# Patient Record
Sex: MALE | Race: WHITE | Employment: FULL TIME | URBAN - METROPOLITAN AREA
[De-identification: names, ages, dates, MRNs, and addresses within clinical notes are randomized per-mention and may not be internally consistent; named-entity substitution may affect disease eponyms.]

---

## 2023-03-05 ENCOUNTER — APPOINTMENT (OUTPATIENT)
Dept: CT IMAGING | Age: 63
DRG: 623 | End: 2023-03-05
Payer: COMMERCIAL

## 2023-03-05 ENCOUNTER — HOSPITAL ENCOUNTER (INPATIENT)
Age: 63
LOS: 4 days | Discharge: NURSING, REHAB OR LONG-TERM CARE | DRG: 623 | End: 2023-03-09
Attending: INTERNAL MEDICINE | Admitting: INTERNAL MEDICINE
Payer: COMMERCIAL

## 2023-03-05 DIAGNOSIS — M86.9 OSTEOMYELITIS OF GREAT TOE OF LEFT FOOT (HCC): ICD-10-CM

## 2023-03-05 DIAGNOSIS — L03.116 CELLULITIS OF LEFT LOWER EXTREMITY: Primary | ICD-10-CM

## 2023-03-05 DIAGNOSIS — R73.9 HYPERGLYCEMIA: ICD-10-CM

## 2023-03-05 PROBLEM — L03.90 CELLULITIS: Status: ACTIVE | Noted: 2023-03-05

## 2023-03-05 LAB
ALBUMIN SERPL-MCNC: 4.1 G/DL (ref 3.5–4.6)
ALP BLD-CCNC: 77 U/L (ref 35–104)
ALT SERPL-CCNC: 21 U/L (ref 0–41)
ANION GAP SERPL CALCULATED.3IONS-SCNC: 13 MEQ/L (ref 9–15)
AST SERPL-CCNC: 17 U/L (ref 0–40)
BASOPHILS ABSOLUTE: 0 K/UL (ref 0–0.1)
BASOPHILS RELATIVE PERCENT: 0.3 % (ref 0.2–1.2)
BILIRUB SERPL-MCNC: 0.5 MG/DL (ref 0.2–0.7)
BUN BLDV-MCNC: 15 MG/DL (ref 8–23)
C-REACTIVE PROTEIN: 9.1 MG/L (ref 0–5)
CALCIUM SERPL-MCNC: 9.4 MG/DL (ref 8.5–9.9)
CHLORIDE BLD-SCNC: 96 MEQ/L (ref 95–107)
CO2: 24 MEQ/L (ref 20–31)
CREAT SERPL-MCNC: 0.75 MG/DL (ref 0.7–1.2)
EOSINOPHILS ABSOLUTE: 0 K/UL (ref 0–0.5)
EOSINOPHILS RELATIVE PERCENT: 0.3 % (ref 0.8–7)
GFR SERPL CREATININE-BSD FRML MDRD: >60 ML/MIN/{1.73_M2}
GLOBULIN: 3.3 G/DL (ref 2.3–3.5)
GLUCOSE BLD-MCNC: 311 MG/DL (ref 70–99)
GLUCOSE BLD-MCNC: 334 MG/DL (ref 70–99)
HCT VFR BLD CALC: 42.6 % (ref 42–52)
HEMOGLOBIN: 14.4 G/DL (ref 13.7–17.5)
IMMATURE GRANULOCYTES #: 0 K/UL
IMMATURE GRANULOCYTES %: 0.3 %
LACTIC ACID: 1.5 MMOL/L (ref 0.5–2.2)
LYMPHOCYTES ABSOLUTE: 1.9 K/UL (ref 1.3–3.6)
LYMPHOCYTES RELATIVE PERCENT: 24.3 %
MCH RBC QN AUTO: 29.9 PG (ref 25.7–32.2)
MCHC RBC AUTO-ENTMCNC: 33.8 % (ref 32.3–36.5)
MCV RBC AUTO: 88.6 FL (ref 79–92.2)
MONOCYTES ABSOLUTE: 0.5 K/UL (ref 0.3–0.8)
MONOCYTES RELATIVE PERCENT: 5.8 % (ref 5.3–12.2)
NEUTROPHILS ABSOLUTE: 5.4 K/UL (ref 1.8–5.4)
NEUTROPHILS RELATIVE PERCENT: 69 % (ref 34–67.9)
PDW BLD-RTO: 12.2 % (ref 11.6–14.4)
PERFORMED ON: ABNORMAL
PLATELET # BLD: 215 K/UL (ref 163–337)
POTASSIUM REFLEX MAGNESIUM: 3.8 MEQ/L (ref 3.4–4.9)
RBC # BLD: 4.81 M/UL (ref 4.63–6.08)
SEDIMENTATION RATE, ERYTHROCYTE: 22 MM (ref 0–20)
SODIUM BLD-SCNC: 133 MEQ/L (ref 135–144)
TOTAL PROTEIN: 7.4 G/DL (ref 6.3–8)
WBC # BLD: 7.8 K/UL (ref 4.2–9)

## 2023-03-05 PROCEDURE — 86140 C-REACTIVE PROTEIN: CPT

## 2023-03-05 PROCEDURE — 83605 ASSAY OF LACTIC ACID: CPT

## 2023-03-05 PROCEDURE — 2580000003 HC RX 258: Performed by: INTERNAL MEDICINE

## 2023-03-05 PROCEDURE — 2580000003 HC RX 258

## 2023-03-05 PROCEDURE — 96367 TX/PROPH/DG ADDL SEQ IV INF: CPT

## 2023-03-05 PROCEDURE — 85025 COMPLETE CBC W/AUTO DIFF WBC: CPT

## 2023-03-05 PROCEDURE — 87070 CULTURE OTHR SPECIMN AEROBIC: CPT

## 2023-03-05 PROCEDURE — 87186 SC STD MICRODIL/AGAR DIL: CPT

## 2023-03-05 PROCEDURE — 96365 THER/PROPH/DIAG IV INF INIT: CPT

## 2023-03-05 PROCEDURE — 6360000002 HC RX W HCPCS

## 2023-03-05 PROCEDURE — 36415 COLL VENOUS BLD VENIPUNCTURE: CPT

## 2023-03-05 PROCEDURE — 1210000000 HC MED SURG R&B

## 2023-03-05 PROCEDURE — 73701 CT LOWER EXTREMITY W/DYE: CPT

## 2023-03-05 PROCEDURE — 87040 BLOOD CULTURE FOR BACTERIA: CPT

## 2023-03-05 PROCEDURE — 85652 RBC SED RATE AUTOMATED: CPT

## 2023-03-05 PROCEDURE — 99285 EMERGENCY DEPT VISIT HI MDM: CPT

## 2023-03-05 PROCEDURE — 83036 HEMOGLOBIN GLYCOSYLATED A1C: CPT

## 2023-03-05 PROCEDURE — 80053 COMPREHEN METABOLIC PANEL: CPT

## 2023-03-05 PROCEDURE — 6360000004 HC RX CONTRAST MEDICATION

## 2023-03-05 PROCEDURE — 87077 CULTURE AEROBIC IDENTIFY: CPT

## 2023-03-05 PROCEDURE — 6360000002 HC RX W HCPCS: Performed by: INTERNAL MEDICINE

## 2023-03-05 RX ORDER — INSULIN LISPRO 100 [IU]/ML
0-4 INJECTION, SOLUTION INTRAVENOUS; SUBCUTANEOUS NIGHTLY
Status: DISCONTINUED | OUTPATIENT
Start: 2023-03-05 | End: 2023-03-09 | Stop reason: HOSPADM

## 2023-03-05 RX ORDER — ACETAMINOPHEN 650 MG/1
650 SUPPOSITORY RECTAL EVERY 6 HOURS PRN
Status: DISCONTINUED | OUTPATIENT
Start: 2023-03-05 | End: 2023-03-09 | Stop reason: HOSPADM

## 2023-03-05 RX ORDER — PROMETHAZINE HYDROCHLORIDE 12.5 MG/1
12.5 TABLET ORAL EVERY 6 HOURS PRN
Status: DISCONTINUED | OUTPATIENT
Start: 2023-03-05 | End: 2023-03-09 | Stop reason: HOSPADM

## 2023-03-05 RX ORDER — DEXTROSE MONOHYDRATE 100 MG/ML
INJECTION, SOLUTION INTRAVENOUS CONTINUOUS PRN
Status: DISCONTINUED | OUTPATIENT
Start: 2023-03-05 | End: 2023-03-09 | Stop reason: HOSPADM

## 2023-03-05 RX ORDER — ACETAMINOPHEN 325 MG/1
650 TABLET ORAL EVERY 6 HOURS PRN
Status: DISCONTINUED | OUTPATIENT
Start: 2023-03-05 | End: 2023-03-09 | Stop reason: HOSPADM

## 2023-03-05 RX ORDER — INSULIN LISPRO 100 [IU]/ML
0-8 INJECTION, SOLUTION INTRAVENOUS; SUBCUTANEOUS
Status: DISCONTINUED | OUTPATIENT
Start: 2023-03-06 | End: 2023-03-09 | Stop reason: HOSPADM

## 2023-03-05 RX ORDER — ONDANSETRON 2 MG/ML
4 INJECTION INTRAMUSCULAR; INTRAVENOUS EVERY 6 HOURS PRN
Status: DISCONTINUED | OUTPATIENT
Start: 2023-03-05 | End: 2023-03-09 | Stop reason: HOSPADM

## 2023-03-05 RX ORDER — 0.9 % SODIUM CHLORIDE 0.9 %
500 INTRAVENOUS SOLUTION INTRAVENOUS ONCE
Status: COMPLETED | OUTPATIENT
Start: 2023-03-05 | End: 2023-03-05

## 2023-03-05 RX ORDER — SODIUM CHLORIDE 0.9 % (FLUSH) 0.9 %
10 SYRINGE (ML) INJECTION EVERY 12 HOURS SCHEDULED
Status: DISCONTINUED | OUTPATIENT
Start: 2023-03-05 | End: 2023-03-08

## 2023-03-05 RX ORDER — ENOXAPARIN SODIUM 100 MG/ML
30 INJECTION SUBCUTANEOUS 2 TIMES DAILY
Status: DISCONTINUED | OUTPATIENT
Start: 2023-03-06 | End: 2023-03-09 | Stop reason: HOSPADM

## 2023-03-05 RX ORDER — SODIUM CHLORIDE 9 MG/ML
INJECTION, SOLUTION INTRAVENOUS PRN
Status: DISCONTINUED | OUTPATIENT
Start: 2023-03-05 | End: 2023-03-09 | Stop reason: HOSPADM

## 2023-03-05 RX ORDER — POLYETHYLENE GLYCOL 3350 17 G/17G
17 POWDER, FOR SOLUTION ORAL DAILY PRN
Status: DISCONTINUED | OUTPATIENT
Start: 2023-03-05 | End: 2023-03-09 | Stop reason: HOSPADM

## 2023-03-05 RX ORDER — SODIUM CHLORIDE 0.9 % (FLUSH) 0.9 %
10 SYRINGE (ML) INJECTION PRN
Status: DISCONTINUED | OUTPATIENT
Start: 2023-03-05 | End: 2023-03-09 | Stop reason: HOSPADM

## 2023-03-05 RX ORDER — INSULIN LISPRO 100 [IU]/ML
5 INJECTION, SOLUTION INTRAVENOUS; SUBCUTANEOUS
Status: DISCONTINUED | OUTPATIENT
Start: 2023-03-06 | End: 2023-03-09 | Stop reason: HOSPADM

## 2023-03-05 RX ADMIN — IOPAMIDOL 75 ML: 612 INJECTION, SOLUTION INTRAVENOUS at 17:17

## 2023-03-05 RX ADMIN — CEFAZOLIN 2000 MG: 1 INJECTION, POWDER, FOR SOLUTION INTRAMUSCULAR; INTRAVENOUS at 21:30

## 2023-03-05 RX ADMIN — SODIUM CHLORIDE 500 ML: 9 INJECTION, SOLUTION INTRAVENOUS at 17:59

## 2023-03-05 RX ADMIN — INSULIN LISPRO 4 UNITS: 100 INJECTION, SOLUTION INTRAVENOUS; SUBCUTANEOUS at 21:33

## 2023-03-05 RX ADMIN — VANCOMYCIN HYDROCHLORIDE 1000 MG: 1 INJECTION, POWDER, LYOPHILIZED, FOR SOLUTION INTRAVENOUS at 19:56

## 2023-03-05 RX ADMIN — Medication 10 ML: at 21:31

## 2023-03-05 RX ADMIN — CEFTRIAXONE 1000 MG: 1 INJECTION, POWDER, FOR SOLUTION INTRAMUSCULAR; INTRAVENOUS at 17:59

## 2023-03-05 ASSESSMENT — ENCOUNTER SYMPTOMS
VOMITING: 0
SHORTNESS OF BREATH: 0
SORE THROAT: 0
COUGH: 0
DIARRHEA: 0
BACK PAIN: 0
ABDOMINAL PAIN: 0
NAUSEA: 0

## 2023-03-05 ASSESSMENT — PAIN SCALES - GENERAL: PAINLEVEL_OUTOF10: 5

## 2023-03-05 ASSESSMENT — PAIN - FUNCTIONAL ASSESSMENT: PAIN_FUNCTIONAL_ASSESSMENT: 0-10

## 2023-03-05 NOTE — ED PROVIDER NOTES
[facility] ED  eMERGENCYdEPARTMENT eNCOUnter      Pt Name: Geovanna Montes  MRN: [id number]  [place] [id number] evaluation: 3/5/2023  Provider:CINTHIA Herron CNP    CHIEF COMPLAINT       Chief Complaint   Patient presents with    Toe Pain         HISTORY OF PRESENT ILLNESS  (Location/Symptom, Timing/Onset, Context/Setting, Quality, Duration, Modifying Factors, Severity.)   Geovanna Montes is a 57 y.o. male  no significant known medical hx who presents to the emergency department with toe pain. Patient states 1 month ago he was grinding a callus off his big toe on left side when he caused a abrasion to the skin that got infected. He was treated with doxycyline from urgent care a couple weeks ago and states infection is now worse. He denies any pain or fever. Denies any CP, SOB, fever, chills, nausea, emesis, abdominal pain, headache. HPI    Nursing Notes were reviewed and I agree. REVIEW OF SYSTEMS    (2-9 systems for level 4, 10 or more for level 5)     Review of Systems   Constitutional:  Negative for activity change, chills and fever. HENT:  Negative for ear pain and sore throat. Eyes:  Negative for visual disturbance. Respiratory:  Negative for cough and shortness of breath. Cardiovascular:  Negative for chest pain, palpitations and leg swelling. Gastrointestinal:  Negative for abdominal pain, diarrhea, nausea and vomiting. Genitourinary:  Negative for dysuria. Musculoskeletal:  Positive for arthralgias (left great toe pain). Negative for back pain. Skin:  Positive for wound (left great toe). Negative for rash. Neurological:  Negative for dizziness and weakness. as noted above the remainder of the review of systems was reviewed and negative. PAST MEDICAL HISTORY   No past medical history on file. SURGICAL HISTORY     No past surgical history on file.       CURRENT MEDICATIONS       Previous Medications    No medications on file       ALLERGIES Patient has no known allergies. HISTORY     No family history on file. SOCIAL HISTORY       Social History     Socioeconomic History    Marital status: Single       SCREENINGS    [place] Coma Scale  Eye Opening: Spontaneous  Best Verbal Response: Oriented  Best Motor Response: Obeys commands  [place] Coma Scale Score: 15      PHYSICAL EXAM    (up to 7 forlevel 4, 8 or more for level 5)     ED Triage Vitals   BP Temp Temp src Pulse Resp SpO2 Height Weight   -- -- -- -- -- -- -- --       Physical Exam  Vitals and nursing note reviewed. Constitutional:       General: He is not in acute distress. Appearance: He is well-developed. He is not ill-appearing. HENT:      Head: Normocephalic. Right Ear: External ear normal.      Left Ear: External ear normal.      Mouth/Throat:      Mouth: Mucous membranes are moist.   Eyes:      Conjunctiva/sclera: Conjunctivae normal.      Pupils: Pupils are equal, round, and reactive to light. Cardiovascular:      Rate and Rhythm: Normal rate and regular rhythm. Pulses: Normal pulses. Dorsalis pedis pulses are 2+ on the right side and 2+ on the left side. Posterior tibial pulses are 2+ on the right side and 2+ on the left side. Heart sounds: Normal heart sounds. No murmur heard. No friction rub. Pulmonary:      Effort: Pulmonary effort is normal.      Breath sounds: Normal breath sounds. No wheezing or rhonchi. Abdominal:      General: Bowel sounds are normal. There is no distension. Palpations: Abdomen is soft. Tenderness: There is no abdominal tenderness. Musculoskeletal:         General: Normal range of motion. Cervical back: Normal range of motion and neck supple. Right lower leg: No edema. Left lower leg: No edema. Feet:    Feet:      Comments: Wound tunneling about 0.5 cm with erythema present to left outer great toe. Redness travels from wound site to left ankle.   Skin:     General: Skin is warm and dry. Capillary Refill: Capillary refill takes less than 2 seconds. Findings: Erythema present. Neurological:      General: No focal deficit present. Mental Status: He is alert and oriented to person, place, and time. Mental status is at baseline. Psychiatric:         Mood and Affect: Mood normal.         Behavior: Behavior normal.         Thought Content: Thought content normal.         Judgment: Judgment normal.         DIAGNOSTIC RESULTS     RADIOLOGY:   Non-plain film images such as CT, Ultrasound and MRI are read by the radiologist. Plain radiographic images are visualized and preliminarilyinterpreted by CINTHIA Diego CNP with the below findings:        Interpretation per the Radiologist below, if available at the time of this note:    CT FOOT LEFT W CONTRAST   Final Result   No osseous destruction to indicate osteomyelitis. Diffuse soft tissue swelling. LABS:  Labs Reviewed   CBC WITH AUTO DIFFERENTIAL - Abnormal; Notable for the following components:       Result Value    Neutrophils % 69.0 (*)     Eosinophils % 0.3 (*)     All other components within normal limits   COMPREHENSIVE METABOLIC PANEL W/ REFLEX TO MG FOR LOW K - Abnormal; Notable for the following components:    Sodium 133 (*)     Glucose 334 (*)     All other components within normal limits   SEDIMENTATION RATE - Abnormal; Notable for the following components:    Sed Rate 22 (*)     All other components within normal limits   CULTURE, ANAEROBIC AND AEROBIC   CULTURE, BLOOD 1   CULTURE, BLOOD 1   LACTIC ACID   C-REACTIVE PROTEIN   HEMOGLOBIN A1C       All other labs were within normal range or not returnedas of this dictation.     EMERGENCYDEPARTMENT COURSE and DIFFERENTIAL DIAGNOSIS/MDM:   Vitals:    Vitals:    03/05/23 1542 03/05/23 1815   BP: (!) 176/97 (!) 165/111   Pulse: 90    Resp: 16    Temp: 98.1 °F (36.7 °C)    SpO2: 100% 100%       REASSESSMENT            MDM     Amount and/or Complexity of Data Reviewed  Clinical lab tests: ordered and reviewed  Tests in the radiology section of CPT®: ordered and reviewed  Discuss the patient with other providers: yes  Independent visualization of images, tracings, or specimens: yes    Risk of Complications, Morbidity, and/or Mortality  Presenting problems: moderate  Diagnostic procedures: low  Management options: low    Patient Progress  Patient progress: stable  JM 57year old male presents to ED for toe pain. Patient afebrile and hemodynamically stable. Labs: CBC shows no leukocytosis or acute anemia. CMP: Na 133, Glucose 334. Sed. Rate 22. Lactic Acid unremarkable. Wound cultures and blood cultures obtained and sent. Rocephin IV given and Vanco IV given to started treatment for cellulitis with concern for osteomyelitis. CT left foot shows:Bones: No evidence of acute fracture or dislocation. No aggressive appearing osseous abnormality or periostitis. Soft Tissue: There is diffuse soft tissue swelling of left foot which may have association with cellulitis. No fluid collection. Joint: The joint spaces of hindfoot, midfoot, and forefoot are intact. .  No osseous erosions. Suspect cellulitis left foot with lymphangitis and failure of outpatient antibiotics, and new onset DM as . Medicated with IVF NS. HGA1C added and labs sent out. Call put out to discuss case with [facility] Dr. Patricia Guerin for admission. Dr. Patricia Guerin will admit patient to [facility]. Discussed POC to admit with patient. Care turned over to Dr. Patricia Guerin at this time. PROCEDURES:    Procedures      FINAL IMPRESSION      1. Cellulitis of left lower extremity    2. Hyperglycemia          DISPOSITION/PLAN   DISPOSITION Decision To Admit 03/05/2023 07:59:34 PM      PATIENT REFERRED TO:  No follow-up provider specified.     DISCHARGE MEDICATIONS:  New Prescriptions    No medications on file       (Please note that portions of this note were completed with a voice recognition program. Efforts were made to edit the dictations but occasionally words are mis-transcribed.)    CINTHIA Arnold - CINTHIA Little CNP  03/05/23 2014

## 2023-03-05 NOTE — ED NOTES
Wound on left great toe is dy and has no drainage to culture      Turner Jeffrey, SALTY  03/05/23 5699

## 2023-03-05 NOTE — ED TRIAGE NOTES
Pt a/ox3 skin w d intact ulcer noted to side of left great toe  minimal drainage noted  skin is dry around the ulcer  appears to be penetrating approx . 5 cm  deep

## 2023-03-06 ENCOUNTER — APPOINTMENT (OUTPATIENT)
Dept: ULTRASOUND IMAGING | Age: 63
DRG: 623 | End: 2023-03-06
Payer: COMMERCIAL

## 2023-03-06 LAB
ANION GAP SERPL CALCULATED.3IONS-SCNC: 11 MEQ/L (ref 9–15)
BASOPHILS ABSOLUTE: 0 K/UL (ref 0–0.1)
BASOPHILS RELATIVE PERCENT: 0.3 % (ref 0.2–1.2)
BUN BLDV-MCNC: 11 MG/DL (ref 8–23)
CALCIUM SERPL-MCNC: 9 MG/DL (ref 8.5–9.9)
CHLORIDE BLD-SCNC: 100 MEQ/L (ref 95–107)
CHOLESTEROL, TOTAL: 170 MG/DL (ref 0–199)
CO2: 23 MEQ/L (ref 20–31)
CREAT SERPL-MCNC: 0.73 MG/DL (ref 0.7–1.2)
EOSINOPHILS ABSOLUTE: 0.1 K/UL (ref 0–0.5)
EOSINOPHILS RELATIVE PERCENT: 0.9 % (ref 0.8–7)
GFR SERPL CREATININE-BSD FRML MDRD: >60 ML/MIN/{1.73_M2}
GLUCOSE BLD-MCNC: 100 MG/DL (ref 70–99)
GLUCOSE BLD-MCNC: 125 MG/DL (ref 70–99)
GLUCOSE BLD-MCNC: 227 MG/DL (ref 70–99)
GLUCOSE BLD-MCNC: 228 MG/DL (ref 70–99)
GLUCOSE BLD-MCNC: 246 MG/DL (ref 70–99)
HBA1C MFR BLD: 11.8 % (ref 4.8–5.9)
HCT VFR BLD CALC: 40.7 % (ref 42–52)
HDLC SERPL-MCNC: 64 MG/DL (ref 40–59)
HEMOGLOBIN: 13.8 G/DL (ref 13.7–17.5)
IMMATURE GRANULOCYTES #: 0 K/UL
IMMATURE GRANULOCYTES %: 0.4 %
LDL CHOLESTEROL CALCULATED: 88 MG/DL (ref 0–129)
LYMPHOCYTES ABSOLUTE: 2.4 K/UL (ref 1.3–3.6)
LYMPHOCYTES RELATIVE PERCENT: 31.8 %
MCH RBC QN AUTO: 30.1 PG (ref 25.7–32.2)
MCHC RBC AUTO-ENTMCNC: 33.9 % (ref 32.3–36.5)
MCV RBC AUTO: 88.7 FL (ref 79–92.2)
MONOCYTES ABSOLUTE: 0.6 K/UL (ref 0.3–0.8)
MONOCYTES RELATIVE PERCENT: 8.5 % (ref 5.3–12.2)
NEUTROPHILS ABSOLUTE: 4.3 K/UL (ref 1.8–5.4)
NEUTROPHILS RELATIVE PERCENT: 58.1 % (ref 34–67.9)
PDW BLD-RTO: 12.5 % (ref 11.6–14.4)
PERFORMED ON: ABNORMAL
PLATELET # BLD: 206 K/UL (ref 163–337)
POTASSIUM REFLEX MAGNESIUM: 3.8 MEQ/L (ref 3.4–4.9)
RBC # BLD: 4.59 M/UL (ref 4.63–6.08)
SODIUM BLD-SCNC: 134 MEQ/L (ref 135–144)
TRIGL SERPL-MCNC: 89 MG/DL (ref 0–150)
WBC # BLD: 7.5 K/UL (ref 4.2–9)

## 2023-03-06 PROCEDURE — 93926 LOWER EXTREMITY STUDY: CPT

## 2023-03-06 PROCEDURE — 87070 CULTURE OTHR SPECIMN AEROBIC: CPT

## 2023-03-06 PROCEDURE — 80061 LIPID PANEL: CPT

## 2023-03-06 PROCEDURE — 1210000000 HC MED SURG R&B

## 2023-03-06 PROCEDURE — 85025 COMPLETE CBC W/AUTO DIFF WBC: CPT

## 2023-03-06 PROCEDURE — G0108 DIAB MANAGE TRN  PER INDIV: HCPCS

## 2023-03-06 PROCEDURE — 80048 BASIC METABOLIC PNL TOTAL CA: CPT

## 2023-03-06 PROCEDURE — 2580000003 HC RX 258: Performed by: INTERNAL MEDICINE

## 2023-03-06 PROCEDURE — 36415 COLL VENOUS BLD VENIPUNCTURE: CPT

## 2023-03-06 PROCEDURE — 6360000002 HC RX W HCPCS: Performed by: INTERNAL MEDICINE

## 2023-03-06 PROCEDURE — 6370000000 HC RX 637 (ALT 250 FOR IP): Performed by: INTERNAL MEDICINE

## 2023-03-06 PROCEDURE — 0JBR0ZZ EXCISION OF LEFT FOOT SUBCUTANEOUS TISSUE AND FASCIA, OPEN APPROACH: ICD-10-PCS | Performed by: PODIATRIST

## 2023-03-06 RX ORDER — LISINOPRIL 10 MG/1
10 TABLET ORAL DAILY
Status: DISCONTINUED | OUTPATIENT
Start: 2023-03-06 | End: 2023-03-07

## 2023-03-06 RX ORDER — ASPIRIN 81 MG/1
81 TABLET, CHEWABLE ORAL DAILY
Status: DISCONTINUED | OUTPATIENT
Start: 2023-03-06 | End: 2023-03-09 | Stop reason: HOSPADM

## 2023-03-06 RX ORDER — GLIPIZIDE 5 MG/1
5 TABLET ORAL
Status: DISCONTINUED | OUTPATIENT
Start: 2023-03-06 | End: 2023-03-09 | Stop reason: HOSPADM

## 2023-03-06 RX ADMIN — CEFAZOLIN 2000 MG: 1 INJECTION, POWDER, FOR SOLUTION INTRAMUSCULAR; INTRAVENOUS at 05:53

## 2023-03-06 RX ADMIN — ENOXAPARIN SODIUM 30 MG: 100 INJECTION SUBCUTANEOUS at 21:08

## 2023-03-06 RX ADMIN — GLIPIZIDE 5 MG: 5 TABLET ORAL at 10:01

## 2023-03-06 RX ADMIN — VANCOMYCIN HYDROCHLORIDE 1250 MG: 1.25 INJECTION, POWDER, LYOPHILIZED, FOR SOLUTION INTRAVENOUS at 14:58

## 2023-03-06 RX ADMIN — LISINOPRIL 10 MG: 10 TABLET ORAL at 09:46

## 2023-03-06 RX ADMIN — INSULIN LISPRO 5 UNITS: 100 INJECTION, SOLUTION INTRAVENOUS; SUBCUTANEOUS at 12:04

## 2023-03-06 RX ADMIN — ASPIRIN 81 MG CHEWABLE TABLET 81 MG: 81 TABLET CHEWABLE at 09:46

## 2023-03-06 RX ADMIN — PIPERACILLIN AND TAZOBACTAM 3375 MG: 3; .375 INJECTION, POWDER, FOR SOLUTION INTRAVENOUS at 17:35

## 2023-03-06 RX ADMIN — INSULIN LISPRO 5 UNITS: 100 INJECTION, SOLUTION INTRAVENOUS; SUBCUTANEOUS at 17:27

## 2023-03-06 RX ADMIN — INSULIN LISPRO 5 UNITS: 100 INJECTION, SOLUTION INTRAVENOUS; SUBCUTANEOUS at 08:15

## 2023-03-06 RX ADMIN — INSULIN LISPRO 2 UNITS: 100 INJECTION, SOLUTION INTRAVENOUS; SUBCUTANEOUS at 12:05

## 2023-03-06 RX ADMIN — Medication 10 ML: at 21:09

## 2023-03-06 RX ADMIN — Medication 10 ML: at 12:08

## 2023-03-06 RX ADMIN — INSULIN LISPRO 2 UNITS: 100 INJECTION, SOLUTION INTRAVENOUS; SUBCUTANEOUS at 08:17

## 2023-03-06 RX ADMIN — PIPERACILLIN AND TAZOBACTAM 3375 MG: 3; .375 INJECTION, POWDER, FOR SOLUTION INTRAVENOUS at 09:52

## 2023-03-06 RX ADMIN — GLIPIZIDE 5 MG: 5 TABLET ORAL at 17:43

## 2023-03-06 RX ADMIN — VANCOMYCIN HYDROCHLORIDE 1250 MG: 1.25 INJECTION, POWDER, LYOPHILIZED, FOR SOLUTION INTRAVENOUS at 22:10

## 2023-03-06 ASSESSMENT — PAIN SCALES - GENERAL
PAINLEVEL_OUTOF10: 0
PAINLEVEL_OUTOF10: 0

## 2023-03-06 NOTE — PROGRESS NOTES
Pt admitted to room 212. Pt alert and oriented. Pt independent in room. Pt has cellulitis on left leg and a wound on left big toe. Pt wound cleansed with normal saline, wrapped with 2x2 and kirlex. Pt denies any pain. Pt call light in reach. Will continue to monitor pt.   Electronically signed by Capo Ham RN on 3/5/2023 at 10:09 PM

## 2023-03-06 NOTE — PROGRESS NOTES
[address] [facility] Pharmacokinetic Monitoring Service - Vancomycin     Eusebio Lyons is a 57 y.o. male starting on vancomycin therapy for SSTI. Pharmacy consulted by Dr Erum Biggs for monitoring and adjustment. Target Concentration: Goal AUC/ELTON 400-600 mg*hr/L    Additional Antimicrobials: Zosyn (rocephin and ancef given 3/5)    Pertinent Laboratory Values: Wt Readings from Last 1 Encounters:   03/05/23 234 lb 3.2 oz (106.2 kg)     Temp Readings from Last 1 Encounters:   03/06/23 98.2 °F (36.8 °C) (Oral)     Estimated Creatinine Clearance: 125 mL/min (based on SCr of 0.73 mg/dL). Recent Labs     03/05/23  1603 03/06/23  0603   CREATININE 0.75 0.73   WBC 7.8 7.5     Procalcitonin: n/a    Pertinent Cultures:  Culture Date Source Results   3/5 Wound, blood pending   MRSA Nasal Swab: N/A. Non-respiratory infection.     Plan:  Dosing recommendations based on Bayesian software  Start vancomycin 1000 mg given once in ED 3/5, will increase to 1250 mg IV q12h  Anticipated AUC of 457 and trough concentration of 14.2 at steady state  Renal labs as indicated   Vancomycin concentration ordered for 3/7 @ 0600   Pharmacy will continue to monitor patient and adjust therapy as indicated    Thank you for the consult,  Julianne Roper [facility], [facility]  3/6/2023 9:37 AM

## 2023-03-06 NOTE — PROGRESS NOTES
Pharmacy Dose Adjustment Per Protocol    Javier Sanchez is a 57 y.o. male. Recent Labs     03/05/23  1603 03/06/23  0603   BUN 15 11   CREATININE 0.75 0.73   Estimated Creatinine Clearance: 125 mL/min (based on SCr of 0.73 mg/dL). Kaur Bass Height: 5' 10\" (177.8 cm), Weight: 234 lb 3.2 oz (106.2 kg)      Current order:  Enoxaparin 40 mg SUBQ once daily      Plan:  Pharmacologic VTE prophylaxis modified based on patient weight per [facility]/P&T approved protocol     Patient Weight (kg)      50.9 and below .9 101-150.9 151-174.9 175 or greater   Estimated   CrCl  (ml/min) 30 or greater []   30 mg   SUBQ daily   []   40 mg   SUBQ daily [x]  30 mg SUBQ   BID  []  40 mg   SUBQ   BID []  60mg SUBQ BID    15-29.9 []  UFH 5000   units SUBQ BID []  30 mg   SUBQ daily [] 30 mg SUBQ   daily []  40 mg SUBQ   daily [] 60 mg SUBQ   daily    Less than 15 or dialysis []  UFH 5000   units SUBQ BID [] UFH 5000 units SUBQ TID []  UFH 7500   units   SUBQ TID       Thank you,    MADYSON Flynn Ph.  3/6/2023  6:44 AM

## 2023-03-06 NOTE — CONSULTS
PODIATRIC MEDICINE AND SURGERY       CONSULT HISTORY AND PHYSICAL       ASSESSMENT:  This 57 y.o. male with PMH of poorly controlled DM presents with left great toe infection, cellulitis      Plan:  Patient examined and evaluated  Previous and today's labs reviewed   Prior imaging reviewed - plain films and CT show no evidence of osteomyelitis  OK for weight bearing in surgical shoe  Dressing change orders placed for dakins wet to dry dressing    Debridement (Excisional) of left great toe ulceration with #15 blade without incident to subQ. No direct extension to bone or joint but may probe deep to tendon. Due ton longstanding naure of ulceration (5 years) an MRI would be advisable to r/o chronic low grade osteomyelitis. Obtained Wound culture from deep sinus post debridement and sent to Micro for culture and sensitivity    Discussed with patient local wound care and probable prolonged course of healing due to chronic nature of wound and very high HgA1C. Patient will need follow up after discharge with wound center. HPI: This very pleasant 57y.o. year old male with PMH of poorly controlled DM seen today for left foot ulceration and cellulitis. Patient states that he came to the emergency room with worsening pain, swelling and the redness involving the left great toe, foot and ankle after failing outpatient antibiotic therapy. Admits to debriding his callus / wound at home with a dremel tool for the past 5 years    Patient denies nausea, vomiting, diarrhea, fevers, chills, chest pain, shortness of breath, head ache, or calf pain. No other pedal complaints. History reviewed. No pertinent past medical history. History reviewed. No pertinent surgical history. [unfilled]    No Known Allergies    History reviewed. No pertinent family history.     Social History     Socioeconomic History    Marital status: Single     Spouse name: Not on file    Number of children: Not on file    Years of education: Not on file    Highest education level: Not on file   Occupational History    Not on file   Tobacco Use    Smoking status: Never    Smokeless tobacco: Never   Substance and Sexual Activity    Alcohol use: Not Currently     Alcohol/week: 1.0 standard drink     Types: 1 Glasses of wine per week    Drug use: Not Currently    Sexual activity: Not on file   Other Topics Concern    Not on file   Social History Narrative    Not on file     Social Determinants of Health     Financial Resource Strain: Not on file   Food Insecurity: Not on file   Transportation Needs: Not on file   Physical Activity: Not on file   Stress: Not on file   Social Connections: Not on file   Intimate Partner Violence: Not on file   Housing Stability: Not on file         REVIEW OF SYSTEMS:  CONSTITUTIONAL:  No fevers, chills, nightsweats, unintended weight loss  HEENT:  Denies frequent or severe headaches, nasal congestion/sinus symptoms, problematic allergy problems. EYES:  No diplopia or blurry vision. CARDIOVASCULAR:  No chest pain, dyspnea, palpitations, orthopnea  PULM:  No dyspnea, unexplained cough. GI:  No dysphagia/odynophagia, problematic reflux, constipation, diarrhea, changes in stool habits, hematochezia, melena. :  No new urinary complaints, including dysuria, gross hematuria or pyuria. NEURO:  No new balance problems, peripheral weakness/paresthesias or numbness of concern. MUSC-SKEL:  + new toe pain, swelling, and erythema. PSY:  No concerns regarding depression, anxiety or panic. INTEGUMENTARY:  No new skin changes (rash, new or changing mole, new growth)      OBJECTIVE:  BP (!) 149/92   Pulse 86   Temp 97.7 °F (36.5 °C) (Oral)   Resp 18   Ht 5' 10\" (1.778 m)   Wt 234 lb 3.2 oz (106.2 kg)   SpO2 98%   BMI 33.60 kg/m²   Patient is alert and oriented x 3 in NAD.      Vascular:   +2 palpable Dorsalis Pedis and Posterior Tibial Pulses B/L  Capillary Fill time < 3 seconds to B/L digits  Skin temperature warm to wqrm tibial tuberosity to the digits B/L  Hair growth positive to digits  + edema left foot    Neurological:   Epicritic sensation absent B/L  No pain sensation on exam or debridement    Musculoskeletal/Orthopaedic:   Structural Deformities: none noted  5/5 muscle strength Dorsiflexion, Plantarflexion, Inversion, Eversion B/L    Dermatological:   Skin appears well hydrated and supple with good temperature, texture, turgor. Hyperkeratosis  thick, ulcerated sub IPJ hallux left foot great toe. +Open lesions present to left sub hallux as described below. Ulceration #1:   Location: sub ipj hallux left  Measurements: 1.0 cm diameter, 0.7cm depth withhard end feel  Base:Mixed granular/fibrotic with seropurulent discharge  Comments:++ periwound erythema, ascending cellulitis to mid-calf medially. Wound border has extensive hyperkeratosis and macerated tissue. Upon debridement with 15 blade, wound probes deep with hard end feel but no exposed bone    CT scan    No osseous destruction to indicate osteomyelitis. Diffuse soft tissue swelling. Arterial duplex ultrasound:    Peroneal artery not well visualized and no appreciable furrow detected in   this artery during the exam.       Lower extremity major arterial structures otherwise patent with multiphasic   waveforms. Thank you for the consult.      Shazia Freedman DPM  March 6, 2023  5:04 PM

## 2023-03-06 NOTE — PROGRESS NOTES
Rafa Hammer, seen for evaluation and education on Type 2 uncontrolled. Lab Results   Component Value Date    LABA1C 11.8 (H) 03/05/2023     No results found for: Ari Duff has recently been diagnosed with Type  2 diabetes. Survival Skill Topics discussed:  [x] Type 2 Diabetes diagnosis as chronic and progressive  [x] Eating healthy - [] plate method [] portion control [x] label reading [x] carb counting  [x] sources of carbohydrates  [x] Being active - current recommendations and safety  [x] Medications - current medications: action, side effects, precautions   Patient's medications: __Home with Glipizide_______________________________________  [x] Monitoring - frequency, & targets   [x] Signs and symptoms of hypo/hyperglycemia,  and Rule of 15 - handouts provided. [] Sick day management - handout provided  [] Testing for ketones  [] Overview of chronic complications and how to prevent them from occurring    [x] Insulin administration   [] New to insulin - instructed on insulin administration as follows:    [] Type of insulin - onset, peak and duration of each type No insulin ordered at discharge as of yet. Receiving Humalog sliding scale at this time.   [x] Preparing   [] vial/syringe  [x]  pen    [x] Proper injection technique with return demonstrated via:  [] Self-injection of __ U ________  [x] Demonstrator Pillow injection  [x] Recommended Injection sites & importance of rotating  [x] Proper storage of insulin  [x] Importance of blood glucose monitoring when taking insulin  [x] Symptoms & treatment of hypoglycemia    [x] Use of glucose meter    [x] New to meter: Patient will  at pharmacy upon discharge  Instructed on:      [x] Overview of meter - 800 number on all machines for help  [x] Proper storage/ handling of meter and test strips  [x] Washing hands, turning on meter - setting date and time  [] Running  checks on the test strips using control solution [x] Loading and using lancet device to obtain an adequate sample  [x] Obtaining blood sample and reading results on meter  [x] Proper disposal of used strips and lancets  [x] Frequency of testing  [x] Importance of record keeping   [x] When to call their PCM with abnormal results  [x] Desired blood glucose goals for optimal control - handout given  [x] The patient return demonstrated  [x] verbally [] Freestyle demo with lancing device    The following support materials were provided for patient to take home:  [x] Diabetes Education [employer]  [] \"Type 2 Diabetes and Adding Insulin\" take home kit with survival skills fold out          [] Self-care diary/log for blood glucose and food          [] Diabetes ID card              [x] [facility] Diabetes Education brochure/ contact card              [] Other:________    RECOMMENDATIONS INPATIENT PLAN:  [] Dietician consult this admission for education on CHO diet and diet plan for home  [x] Would recommend follow -up education at outpatient diabetes education at [facility]. [x] An order has been placed for signature. Diabetes Education team will contact patient for appointment after discharge. [] Patient declines education at this time. Education brochure given for future reference  [] No further education is recommended at this time. RN Bedside Support Plan:  [x] Bedside RN to support patient with administration of insulin at bedside .  Spoke with nurse Larue Duane and asked that patient give his own insulin until discharge in case it is ordered to reinforce site selection , action and side effects, etc   [] Bedside RN to support patient with SMBG - OK to use home meter along-side floor meter  [] Reinforce target BG ranges, Hyper and Hypo signs and symptoms and treatment  [] Bedside RN to coordinate BG Check with mealtime and insulin  [] Bedside RN to ensure snack at [facility] for patient on HS insulin  [x] Bedside RN to reinforce survival skills education and diabetes self-care     [x] Patient will need prescriptions for the following supplies at discharge:   [x] Preferred / formulary blood glucose meter, with strips and lancets for testing    [] Insulin pen needle tips - 4mm lacey for insulin injections   [] Insulin syringe with needles-    ml, 6mm  for insulin injections        Patient verbalizes, demonstrates, and needs reinforcement. understanding  of survival skills education.     Saul Brandt, SALTY

## 2023-03-06 NOTE — PROGRESS NOTES
Comprehensive Nutrition Assessment    Type and Reason for Visit:  Initial, Consult, Patient Education    Nutrition Recommendations/Plan:   Diabetes diet education  Continue 5carb/meal pattern  Consider outpatient nutrition education     Malnutrition Assessment:  Malnutrition Status:  No malnutrition (03/06/23 1421)        Nutrition Assessment:    No s/s of malnutrition. New DM diagnosis. Patient with Diabetes educator at time of attempted diet education. Will reattempt to education on 3/8, prior to anticipated discharge. Nutrition Related Findings:    Admitted related to cellulitis and new DM diagnosis. Admit weight 234#, BMI 33.7- obese. Labs reviewed 3/5 hemoglobin A1c 11.8. DM diet, 5 carbs per meal ordered. Skin- wound to left great toe noted. Wound Type: Diabetic Ulcer       Current Nutrition Intake & Therapies:    Average Meal Intake: %  Average Supplements Intake: None Ordered  ADULT DIET; Regular; 5 carb choices (75 gm/meal)    Anthropometric Measures:  Height: 5' 10\" (177.8 cm)  Ideal Body Weight (IBW): 166 lbs (75 kg)    Admission Body Weight: 234 lb (106.1 kg)  Current Body Weight: 234 lb (106.1 kg) (3/5/23), 141 % IBW. Weight Source: Bed Scale  Current BMI (kg/m2): 33.6  BMI Categories: Obese Class 1 (BMI 30.0-34. 9)    Estimated Daily Nutrient Needs:  Energy Requirements Based On: Kcal/kg  Weight Used for Energy Requirements: Current  Energy (kcal/day): 6411-2502 (17-19kcal/kg)  Weight Used for Protein Requirements: Ideal  Protein (g/day): ~113gm  Method Used for Fluid Requirements: 1 ml/kcal  Fluid (ml/day): ~9702-3728    Nutrition Diagnosis:   Altered nutrition-related lab values related to endocrine dysfuntion as evidenced by lab values (new DM diagnosis.)  Food & Nutrition-related knowledge deficit related to endocrine dysfuntion as evidenced by other (comment) (new DM diagnosis)    Nutrition Interventions:   Food and/or Nutrient Delivery: Continue Current Diet  Nutrition Education/Counseling: Education initiated  Coordination of Nutrition Care: Continue to monitor while inpatient       Goals:     Goals: PO intake 75% or greater       Nutrition Monitoring and Evaluation:      Food/Nutrient Intake Outcomes: Food and Nutrient Intake  Physical Signs/Symptoms Outcomes: Meal Time Behavior, Weight    Discharge Planning:    Continue current diet     Mohan Ramírez RD

## 2023-03-06 NOTE — PLAN OF CARE
Nutrition Problem #1: Altered nutrition-related lab values  Intervention: Food and/or Nutrient Delivery: Continue Current Diet

## 2023-03-06 NOTE — H&P
Hospital Medicine  History and Physical    Patient:  Silvana Biggs  MRN: [id number]    CHIEF COMPLAINT:    Chief Complaint   Patient presents with    Toe Pain       History Obtained From:  Patient, EMR  Primary Care Physician: Yvette Giordano MD    HISTORY OF PRESENT ILLNESS:   The patient is a 57 y.o. male with no prior medical history. He came to the emergency room with worsening pain, swelling and the redness involving the left great toe, foot and ankle. He was given oral antibiotic at the urgent care but did not improve his symptoms. This started after patient attempted to cut a callus involving the left great toe. No chest pain or palpitation. No abdominal pain, nausea, vomiting, diarrhea, dysuria or hematuria. No headaches, loss of conscious or seizure. Past Medical History:  History reviewed. No pertinent past medical history. Past Surgical History:  History reviewed. No pertinent surgical history. Medications Prior to Admission:    Prior to Admission medications    Not on File       Allergies:  Patient has no known allergies. Social History:   TOBACCO:   has no history on file for tobacco use. ETOH:   has no history on file for alcohol use. Family History:   History reviewed. No pertinent family history. REVIEW OF SYSTEMS:  Ten systems reviewed and negative except for stated in HPI    Physical Exam:    Vitals: BP (!) 162/100   Pulse 66   Temp 98.2 °F (36.8 °C) (Oral)   Resp 20   Ht 5' 10\" (1.778 m)   Wt 234 lb 3.2 oz (106.2 kg)   SpO2 99%   BMI 33.60 kg/m²   General appearance: alert, appears stated age and cooperative,  Skin: Skin color, texture, turgor normal. No rashes or lesions  HEENT: Head: Normocephalic, no lesions, without obvious abnormality.   Neck: no adenopathy, no carotid bruit, no JVD, supple, symmetrical, trachea midline, and thyroid not enlarged, symmetric, no tenderness/mass/nodules  Lungs: clear to auscultation bilaterally  Heart: regular rate and rhythm, S1, S2 normal, no murmur, click, rub or gallop  Abdomen: soft, non-tender; bowel sounds normal; no masses,  no organomegaly  Extremities:  Erythema involving the anterior, medial and the lateral left shin, dorsal foot and great toe. Stage II ulceration at least involving the plantar aspect of the left great toe measuring about half an inch in diameter. Neurologic: Mental status: Alert, oriented, thought content appropriate     Recent Labs     03/05/23  1603 03/06/23  0603   WBC 7.8 7.5   HGB 14.4 13.8    206     Recent Labs     03/05/23  1603 03/06/23  0603   * 134*   K 3.8 3.8   CL 96 100   CO2 24 23   BUN 15 11   CREATININE 0.75 0.73   GLUCOSE 334* 246*   AST 17  --    ALT 21  --    BILITOT 0.5  --    ALKPHOS 77  --      Troponin T: No results for input(s): TROPONINI in the last 72 hours. ABGs: No results found for: PHART, PO2ART, OKN3BRG  INR: No results for input(s): INR in the last 72 hours. URINALYSIS:No results for input(s): NITRITE, COLORU, PHUR, LABCAST, WBCUA, RBCUA, MUCUS, TRICHOMONAS, YEAST, BACTERIA, CLARITYU, SPECGRAV, LEUKOCYTESUR, UROBILINOGEN, BILIRUBINUR, BLOODU, GLUCOSEU, AMORPHOUS in the last 72 hours. Invalid input(s): Ramsey Justinabdoulaye  -----------------------------------------------------------------   CT FOOT LEFT W CONTRAST    Result Date: 3/5/2023  EXAMINATION: CT OF THE LEFT FOOT WITH CONTRAST 3/5/2023 4:26 pm TECHNIQUE: CT of the left foot was performed with the administration of intravenous contrast.  Multiplanar reformatted images are provided for review. Automated exposure control, iterative reconstruction, and/or weight based adjustment of the mA/kV was utilized to reduce the radiation dose to as low as reasonably achievable. COMPARISON: None.  HISTORY ORDERING SYSTEM PROVIDED HISTORY: r/o osteomyelitis TECHNOLOGIST PROVIDED HISTORY: Reason for exam:->r/o osteomyelitis Decision Support Exception - unselect if not a suspected or confirmed emergency medical condition->Emergency Medical Condition (MA) What reading provider will be dictating this exam?->CRC FINDINGS: Bones: No evidence of acute fracture or dislocation. No aggressive appearing osseous abnormality or periostitis. Soft Tissue: There is diffuse soft tissue swelling of left foot which may have association with cellulitis. No fluid collection. Joint: The joint spaces of hindfoot, midfoot, and forefoot are intact. .  No osseous erosions. No osseous destruction to indicate osteomyelitis. Diffuse soft tissue swelling. Assessment and Plan     *Infected left great toe ulcer with cellulitis extending into the dorsal aspect of the foot, medial ankle, anterior and medial shin area. CT did not show osteomyelitis. *Newly diagnosed hypertension. *Newly diagnosed diabetes mellitus. *Could not exclude the possibility of peripheral vascular disease. *Few other medical issues not listed above. Plan:  I had accepted to admit patient to the medical floor. I started patient on intravenous vancomycin and Zosyn. I started patient on lisinopril. Requested diabetes and diet history and consult for patient education. Lovenox for DVT prophylaxis. I requested arterial duplex to rule out PBC. I requested the podiatry consultation for the possible need of incision and drainage of the great toe. Patient Active Problem List   Diagnosis Code    Cellulitis L03.90       Rob Avitia MD, MD  Admitting Hospitalist    TTS: 85mins where I focused more than 75% of my attention on rendering care, and planning treatment course for this patient, in addition to talking to RN team, mid levels, consulting with other physicians and following up on labs and imaging. High Risk Readmission Screening Tool Score Noted.      Emergency Contact:

## 2023-03-06 NOTE — PROGRESS NOTES
Nutrition Education    Educated on Diabetic Diet  Learners: Patient  Readiness: Bertha Keller but overwhelmed  Method: Explanation and Handout (Diabetes education hand out, label reading, snack list)  Response: Verbalizes Understanding and Needs Reinforcement- Will follow up with patient 3/8/23 to answer further questions. Patient wanted to review diet information and \"let it all sink in\". Contact name and number provided.     Nas Edwards RD

## 2023-03-07 ENCOUNTER — HOSPITAL ENCOUNTER (OUTPATIENT)
Dept: MRI IMAGING | Age: 63
Discharge: HOME | End: 2023-03-09
Payer: COMMERCIAL

## 2023-03-07 LAB
GLUCOSE BLD-MCNC: 137 MG/DL (ref 70–99)
GLUCOSE BLD-MCNC: 180 MG/DL (ref 70–99)
GLUCOSE BLD-MCNC: 262 MG/DL (ref 70–99)
PERFORMED ON: ABNORMAL
VANCOMYCIN RANDOM: 14.1 UG/ML (ref 10–40)

## 2023-03-07 PROCEDURE — 73720 MRI LWR EXTREMITY W/O&W/DYE: CPT

## 2023-03-07 PROCEDURE — A9579 GAD-BASE MR CONTRAST NOS,1ML: HCPCS | Performed by: PODIATRIST

## 2023-03-07 PROCEDURE — 1210000000 HC MED SURG R&B

## 2023-03-07 PROCEDURE — 2580000003 HC RX 258: Performed by: INTERNAL MEDICINE

## 2023-03-07 PROCEDURE — 6370000000 HC RX 637 (ALT 250 FOR IP): Performed by: PODIATRIST

## 2023-03-07 PROCEDURE — 6360000002 HC RX W HCPCS: Performed by: INTERNAL MEDICINE

## 2023-03-07 PROCEDURE — 6370000000 HC RX 637 (ALT 250 FOR IP): Performed by: INTERNAL MEDICINE

## 2023-03-07 PROCEDURE — 2580000003 HC RX 258: Performed by: PODIATRIST

## 2023-03-07 PROCEDURE — 36415 COLL VENOUS BLD VENIPUNCTURE: CPT

## 2023-03-07 PROCEDURE — 6360000004 HC RX CONTRAST MEDICATION: Performed by: PODIATRIST

## 2023-03-07 PROCEDURE — 80202 ASSAY OF VANCOMYCIN: CPT

## 2023-03-07 RX ORDER — SODIUM CHLORIDE 0.9 % (FLUSH) 0.9 %
5-40 SYRINGE (ML) INJECTION EVERY 12 HOURS SCHEDULED
Status: DISCONTINUED | OUTPATIENT
Start: 2023-03-07 | End: 2023-03-09 | Stop reason: HOSPADM

## 2023-03-07 RX ORDER — SODIUM CHLORIDE 9 MG/ML
25 INJECTION, SOLUTION INTRAVENOUS PRN
Status: DISCONTINUED | OUTPATIENT
Start: 2023-03-07 | End: 2023-03-09 | Stop reason: HOSPADM

## 2023-03-07 RX ORDER — SODIUM CHLORIDE 0.9 % (FLUSH) 0.9 %
5-40 SYRINGE (ML) INJECTION PRN
Status: DISCONTINUED | OUTPATIENT
Start: 2023-03-07 | End: 2023-03-09 | Stop reason: HOSPADM

## 2023-03-07 RX ORDER — LIDOCAINE HYDROCHLORIDE 10 MG/ML
5 INJECTION, SOLUTION EPIDURAL; INFILTRATION; INTRACAUDAL; PERINEURAL ONCE
Status: DISCONTINUED | OUTPATIENT
Start: 2023-03-07 | End: 2023-03-09 | Stop reason: HOSPADM

## 2023-03-07 RX ORDER — LISINOPRIL 20 MG/1
20 TABLET ORAL DAILY
Status: DISCONTINUED | OUTPATIENT
Start: 2023-03-08 | End: 2023-03-09 | Stop reason: HOSPADM

## 2023-03-07 RX ADMIN — VANCOMYCIN HYDROCHLORIDE 1250 MG: 1.25 INJECTION, POWDER, LYOPHILIZED, FOR SOLUTION INTRAVENOUS at 12:08

## 2023-03-07 RX ADMIN — SODIUM HYPOCHLORITE: 2.5 SOLUTION TOPICAL at 10:11

## 2023-03-07 RX ADMIN — INSULIN LISPRO 5 UNITS: 100 INJECTION, SOLUTION INTRAVENOUS; SUBCUTANEOUS at 08:00

## 2023-03-07 RX ADMIN — PIPERACILLIN AND TAZOBACTAM 3375 MG: 3; .375 INJECTION, POWDER, FOR SOLUTION INTRAVENOUS at 18:03

## 2023-03-07 RX ADMIN — GADOTERIDOL 20 ML: 279.3 INJECTION, SOLUTION INTRAVENOUS at 16:20

## 2023-03-07 RX ADMIN — INSULIN LISPRO 4 UNITS: 100 INJECTION, SOLUTION INTRAVENOUS; SUBCUTANEOUS at 12:03

## 2023-03-07 RX ADMIN — PIPERACILLIN AND TAZOBACTAM 3375 MG: 3; .375 INJECTION, POWDER, FOR SOLUTION INTRAVENOUS at 00:53

## 2023-03-07 RX ADMIN — LISINOPRIL 10 MG: 10 TABLET ORAL at 07:56

## 2023-03-07 RX ADMIN — ENOXAPARIN SODIUM 30 MG: 100 INJECTION SUBCUTANEOUS at 07:56

## 2023-03-07 RX ADMIN — ASPIRIN 81 MG CHEWABLE TABLET 81 MG: 81 TABLET CHEWABLE at 07:56

## 2023-03-07 RX ADMIN — PIPERACILLIN AND TAZOBACTAM 3375 MG: 3; .375 INJECTION, POWDER, FOR SOLUTION INTRAVENOUS at 07:58

## 2023-03-07 RX ADMIN — METFORMIN HYDROCHLORIDE 500 MG: 500 TABLET ORAL at 10:11

## 2023-03-07 RX ADMIN — Medication 10 ML: at 21:57

## 2023-03-07 RX ADMIN — METFORMIN HYDROCHLORIDE 500 MG: 500 TABLET ORAL at 17:05

## 2023-03-07 RX ADMIN — VANCOMYCIN HYDROCHLORIDE 1500 MG: 1 INJECTION, POWDER, LYOPHILIZED, FOR SOLUTION INTRAVENOUS at 21:57

## 2023-03-07 RX ADMIN — GLIPIZIDE 5 MG: 5 TABLET ORAL at 07:56

## 2023-03-07 RX ADMIN — INSULIN LISPRO 5 UNITS: 100 INJECTION, SOLUTION INTRAVENOUS; SUBCUTANEOUS at 12:02

## 2023-03-07 RX ADMIN — Medication 10 ML: at 08:00

## 2023-03-07 RX ADMIN — GLIPIZIDE 5 MG: 5 TABLET ORAL at 17:05

## 2023-03-07 NOTE — PROGRESS NOTES
Podiatry Progress Note  Juancho Nicely  Subjective :   Patient seen and examined this afternoon. Feeling well. No pain. MRI just completed. Objective     BP (!) 155/87   Pulse 73   Temp 97.9 °F (36.6 °C) (Oral)   Resp 18   Ht 5' 10\" (1.778 m)   Wt 234 lb 3.2 oz (106.2 kg)   SpO2 98%   BMI 33.60 kg/m²      I/O:  Intake/Output Summary (Last 24 hours) at 3/7/2023 1721  Last data filed at 3/7/2023 1245  Gross per 24 hour   Intake 1000 ml   Output --   Net 1000 ml              Wt Readings from Last 3 Encounters:   03/05/23 234 lb 3.2 oz (106.2 kg)       LABS:    Recent Labs     03/05/23  1603 03/06/23  0603   WBC 7.8 7.5   HGB 14.4 13.8   HCT 42.6 40.7*    206        Recent Labs     03/06/23  0603   *   K 3.8      CO2 23   BUN 11   CREATININE 0.73        Recent Labs     03/05/23  1603   PROT 7.4         Exam:     DERMATOLOGIC:   Ulceration much improved, 6mm diameter with mixed fibrogranular base. No purulent drainage expressed. MRI  MRI FOOT LEFT W WO CONTRAST [[id number]]    Collected: 03/07/23 1700    Updated: 03/07/23 1709    Narrative:     EXAMINATION:   MRI OF THE LEFT FOOT WITH AND WITHOUT CONTRAST, 3/7/2023 4:20 pm     TECHNIQUE:   Multiplanar multisequence MRI of the left foot was performed with and without   the administration of intravenous contrast.     COMPARISON:   None     HISTORY:   ORDERING SYSTEM PROVIDED HISTORY: oseomyelitis left great toe   TECHNOLOGIST PROVIDED HISTORY:   Reason for exam:->oseomyelitis left great toe   What is the area of interest?->Forefoot   What reading provider will be dictating this exam?->CRC     FINDINGS:   Limitations: Some imaging sequences degraded by motion artifact. Diffuse soft tissue swelling. Soft tissue ulceration of the medial aspect of   the great toe. Edema and enhancement in the great toe distal phalanx and   proximal phalanx suspicious for osteomyelitis given the adjacent soft tissue   ulceration.      Moderate DJD of the 1st MTP joint. No acute fracture or dislocation. Intact Lisfranc ligament. No soft tissue abscess identified. 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 Impression:     Abnormal marrow signal alteration and enhancement in the great toe proximal   and distal phalanges suspicious for osteomyelitis given the adjacent soft   tissue ulceration. Findings most pronounced in the distal phalanx. Culture, Wound Aerobic Only [[id number]]    Collected: 03/06/23 1723    Updated: 03/07/23 1436    Specimen Type: Wound    Specimen Source: Toe     WOUND/ABSCESS --    Direct Exam:  RARE NEUTROPHILS   Direct Exam:  FEW GRAM POSITIVE COCCI IN PAIRS   Direct Exam:  RARE GRAM NEGATIVE RODS   Direct Exam:  RARE GRAM POSITIVE COCCI IN CLUSTERS   Cult,Wound:  PENDING   Performed at [address], [address] [place] [address]   [phone]          ASSESSMENT/PLAN:   Pt. is a 57 y.o. male with infected chronic diabetic foot ulcer with underlying osteomyelitis    1. Discussed MRI results with patient at length. Discussed management of osteomyelitis vis long term IV antibiotics x 6-8 weeks. Timing, course, dosing regimen to be determined based on final culture results. Discussed surgical debridement of infected bone as an alternative, patient wishes to pursue IV antibiotics and local wound care. 2. Will order PICC  3. Will order ID consult  4. Continue dakins wet to dry dressings, will continue as outpatient. 5. If IV antibiotics can be continued as outpatient patient will need weekly wound care appointments in my office for routine debridement and monitoring.    6. CAM boot ordered for [facility]    3/7/2023 5:21 PM

## 2023-03-07 NOTE — PROGRESS NOTES
[facility] Pharmacokinetic Monitoring Service - Vancomycin    Consulting Provider: Dr Erum Biggs   Indication: SSTI  Target Concentration: Goal AUC/ELTON 400-600 mg*hr/L  Day of Therapy: 2  Additional Antimicrobials: zosyn    Pertinent Laboratory Values: Wt Readings from Last 1 Encounters:   03/05/23 234 lb 3.2 oz (106.2 kg)     Temp Readings from Last 1 Encounters:   03/06/23 97.9 °F (36.6 °C) (Oral)     Estimated Creatinine Clearance: 125 mL/min (based on SCr of 0.73 mg/dL). Recent Labs     03/05/23  1603 03/06/23  0603   CREATININE 0.75 0.73   WBC 7.8 7.5         Pertinent Cultures:  Culture Date Source Results   03/06 wound Enterococcus-sensitivity pending   MRSA Nasal Swab: N/A. Non-respiratory infection.     Recent vancomycin administrations                     vancomycin (VANCOCIN) 1,250 mg in sodium chloride 0.9 % 250 mL IVPB (mg) 1,250 mg New Bag 03/07/23 1208     1,250 mg New Bag 03/06/23 2210     1,250 mg New Bag  1458    vancomycin (VANCOCIN) 1,000 mg in sodium chloride 0.9 % 250 mL IVPB (mg) 1,000 mg New Bag 03/05/23 1956                    Assessment:  Date/Time Current Dose Concentration Timing of Concentration (h) AUC   03/07/23 1250 mg iv q 12 14.1 random 397   Note: Serum concentrations collected for AUC dosing may appear elevated if collected in close proximity to the dose administered, this is not necessarily an indication of toxicity    Plan:  Current dosing regimen is sub-therapeutic  Increase dose to 1500 mg IV q 12  Repeat vancomycin concentration ordered for 03/09 @ 0600   Pharmacy will continue to monitor patient and adjust therapy as indicated    Thank you for the consult,  Brodie Denver, [facility]  3/7/2023 1:12 PM

## 2023-03-07 NOTE — PROGRESS NOTES
Spiritual Care Services     Summary of Visit:  Pt has changed his afia tradition to [employer], which is providing meaning and purpose for him. He does feel alone in his afia, in that he is connected to folks online but not in person. Encouragement and spiritual support provided. Pt did mention that he is interested in making lifestyle changes to address his health condition. Supported this but also spoke of the potential seriousness of his condition if not well managed. Helpful to continue to emphasize this. Encounter Summary  Encounter Overview/Reason : Initial Encounter  Service Provided For[de-identified] Patient  Referral/Consult From[de-identified] Rounding  Support System: Family members, Mandaeism/afia community  Complexity of Encounter: Low  Begin Time: 1030  End Time : 1050  Total Time Calculated: 20 min  Encounter   Type: Initial Screen/Assessment     Spiritual/Emotional needs  Type: Spiritual Support     Grief, Loss, and Adjustments  Type: Life Adjustments        Spiritual Assessment/Intervention/Outcomes:    Assessment: Calm, Coping    Intervention: Active listening, Discussed belief system/Church practices/afia, Discussed illness injury and its impact, Discussed meaning/purpose, Discussed relationship with God, Prayer (assurance of)/[place]    Outcome: Receptive      Care Plan:    Plan and Referrals  Plan/Referrals: Continue Support (comment)      [id number] [address]   Electronically signed by Nikko Palma, [email or web address] on 3/7/2023 at 11:35 AM.    To reach a  for emotional and spiritual support, place an [facility] consult request.   If a  is needed immediately, dial 0 and ask to page the on-call .

## 2023-03-07 NOTE — PROGRESS NOTES
Patient is feeling better. Less pain and discomfort in the left foot and ankle. Hali Karst No chest pain or palpitation. No abdominal pain, nausea or vomiting. No headaches, loss of conscious or seizure. General appearance: alert, appears stated age and cooperative,  Skin: Skin color, texture, turgor normal. No rashes or lesions  HEENT: Head: Normocephalic, no lesions, without obvious abnormality. Neck: no adenopathy, no carotid bruit, no JVD, supple, symmetrical, trachea midline, and thyroid not enlarged, symmetric, no tenderness/mass/nodules  Lungs: clear to auscultation bilaterally  Heart: regular rate and rhythm, S1, S2 normal, no murmur, click, rub or gallop  Abdomen: soft, non-tender; bowel sounds normal; no masses,  no organomegaly  Extremities: Slight improvement of the erythema involving the anterior, medial and the lateral left shin, dorsal foot and great toe. Stage II ulceration at least involving the plantar aspect of the left great toe measuring about half an inch in diameter. Neurologic: Mental status: Alert, oriented, thought content appropriate. *Infected left great toe ulcer with cellulitis extending into the dorsal aspect of the foot, medial ankle, anterior and medial shin area. CT did not show osteomyelitis. *Newly diagnosed hypertension. *Newly diagnosed diabetes mellitus. *Could not exclude the possibility of peripheral vascular disease. *Few other medical issues not listed above. Increase lisinopril up to 20 mg daily for better blood pressure control    Continue glipizide 5 mg twice a day. Added metformin 500 mg twice a day for better diabetes control    Continue intravenous vancomycin and Zosyn for  MRI of the foot was ordered by podiatry. Continue counseling and education about diabetes and diet. Continue aspirin daily. Follow-up on the wound culture report. Continue Lovenox for DVT prophylaxis.

## 2023-03-08 ENCOUNTER — HOSPITAL ENCOUNTER (OUTPATIENT)
Dept: INTERVENTIONAL RADIOLOGY/VASCULAR | Age: 63
Discharge: HOME | End: 2023-03-10
Payer: COMMERCIAL

## 2023-03-08 LAB
GLUCOSE BLD-MCNC: 106 MG/DL (ref 70–99)
GLUCOSE BLD-MCNC: 139 MG/DL (ref 70–99)
GLUCOSE BLD-MCNC: 196 MG/DL (ref 70–99)
GLUCOSE BLD-MCNC: 201 MG/DL (ref 70–99)
GLUCOSE BLD-MCNC: 98 MG/DL (ref 70–99)
ORGANISM: ABNORMAL
ORGANISM: ABNORMAL
PERFORMED ON: ABNORMAL
PERFORMED ON: NORMAL
WOUND/ABSCESS: ABNORMAL

## 2023-03-08 PROCEDURE — 2580000003 HC RX 258: Performed by: PODIATRIST

## 2023-03-08 PROCEDURE — 2500000003 HC RX 250 WO HCPCS: Performed by: PODIATRIST

## 2023-03-08 PROCEDURE — 1210000000 HC MED SURG R&B

## 2023-03-08 PROCEDURE — 6360000002 HC RX W HCPCS: Performed by: INTERNAL MEDICINE

## 2023-03-08 PROCEDURE — 6370000000 HC RX 637 (ALT 250 FOR IP): Performed by: INTERNAL MEDICINE

## 2023-03-08 PROCEDURE — 2580000003 HC RX 258: Performed by: INTERNAL MEDICINE

## 2023-03-08 PROCEDURE — 36573 INSJ PICC RS&I 5 YR+: CPT

## 2023-03-08 PROCEDURE — 05HY33Z INSERTION OF INFUSION DEVICE INTO UPPER VEIN, PERCUTANEOUS APPROACH: ICD-10-PCS | Performed by: INTERNAL MEDICINE

## 2023-03-08 PROCEDURE — 2709999900 IR PICC WO SQ PORT/PUMP > 5 YEARS

## 2023-03-08 RX ORDER — LIDOCAINE HYDROCHLORIDE 20 MG/ML
5 INJECTION, SOLUTION INFILTRATION; PERINEURAL ONCE
Status: COMPLETED | OUTPATIENT
Start: 2023-03-08 | End: 2023-03-08

## 2023-03-08 RX ORDER — SODIUM CHLORIDE 9 MG/ML
250 INJECTION, SOLUTION INTRAVENOUS ONCE
Status: COMPLETED | OUTPATIENT
Start: 2023-03-08 | End: 2023-03-08

## 2023-03-08 RX ORDER — SODIUM CHLORIDE 0.9 % (FLUSH) 0.9 %
5-40 SYRINGE (ML) INJECTION PRN
Status: DISCONTINUED | OUTPATIENT
Start: 2023-03-08 | End: 2023-03-11 | Stop reason: HOSPADM

## 2023-03-08 RX ORDER — SODIUM CHLORIDE 0.9 % (FLUSH) 0.9 %
5-40 SYRINGE (ML) INJECTION EVERY 12 HOURS SCHEDULED
Status: DISCONTINUED | OUTPATIENT
Start: 2023-03-08 | End: 2023-03-11 | Stop reason: HOSPADM

## 2023-03-08 RX ORDER — SODIUM CHLORIDE 9 MG/ML
INJECTION, SOLUTION INTRAVENOUS PRN
Status: DISCONTINUED | OUTPATIENT
Start: 2023-03-08 | End: 2023-03-11 | Stop reason: HOSPADM

## 2023-03-08 RX ADMIN — PIPERACILLIN AND TAZOBACTAM 3375 MG: 3; .375 INJECTION, POWDER, FOR SOLUTION INTRAVENOUS at 14:24

## 2023-03-08 RX ADMIN — ASPIRIN 81 MG CHEWABLE TABLET 81 MG: 81 TABLET CHEWABLE at 08:33

## 2023-03-08 RX ADMIN — PIPERACILLIN AND TAZOBACTAM 3375 MG: 3; .375 INJECTION, POWDER, FOR SOLUTION INTRAVENOUS at 22:06

## 2023-03-08 RX ADMIN — GLIPIZIDE 5 MG: 5 TABLET ORAL at 08:33

## 2023-03-08 RX ADMIN — LIDOCAINE HYDROCHLORIDE 5 ML: 20 INJECTION, SOLUTION INFILTRATION; PERINEURAL at 11:12

## 2023-03-08 RX ADMIN — ENOXAPARIN SODIUM 30 MG: 100 INJECTION SUBCUTANEOUS at 20:32

## 2023-03-08 RX ADMIN — METFORMIN HYDROCHLORIDE 500 MG: 500 TABLET ORAL at 08:33

## 2023-03-08 RX ADMIN — GLIPIZIDE 5 MG: 5 TABLET ORAL at 16:56

## 2023-03-08 RX ADMIN — LISINOPRIL 20 MG: 20 TABLET ORAL at 08:33

## 2023-03-08 RX ADMIN — SODIUM CHLORIDE 250 ML: 9 INJECTION, SOLUTION INTRAVENOUS at 11:12

## 2023-03-08 RX ADMIN — ENOXAPARIN SODIUM 30 MG: 100 INJECTION SUBCUTANEOUS at 00:04

## 2023-03-08 RX ADMIN — PIPERACILLIN AND TAZOBACTAM 3375 MG: 3; .375 INJECTION, POWDER, FOR SOLUTION INTRAVENOUS at 00:01

## 2023-03-08 RX ADMIN — INSULIN LISPRO 5 UNITS: 100 INJECTION, SOLUTION INTRAVENOUS; SUBCUTANEOUS at 16:57

## 2023-03-08 RX ADMIN — Medication 10 ML: at 08:34

## 2023-03-08 RX ADMIN — Medication 10 ML: at 20:32

## 2023-03-08 RX ADMIN — METFORMIN HYDROCHLORIDE 500 MG: 500 TABLET ORAL at 16:56

## 2023-03-08 RX ADMIN — VANCOMYCIN HYDROCHLORIDE 1500 MG: 1 INJECTION, POWDER, LYOPHILIZED, FOR SOLUTION INTRAVENOUS at 22:08

## 2023-03-08 RX ADMIN — INSULIN LISPRO 2 UNITS: 100 INJECTION, SOLUTION INTRAVENOUS; SUBCUTANEOUS at 16:56

## 2023-03-08 RX ADMIN — SODIUM HYPOCHLORITE: 2.5 SOLUTION TOPICAL at 08:34

## 2023-03-08 RX ADMIN — VANCOMYCIN HYDROCHLORIDE 1500 MG: 1 INJECTION, POWDER, LYOPHILIZED, FOR SOLUTION INTRAVENOUS at 09:11

## 2023-03-08 NOTE — PROGRESS NOTES
Chart reviewed. Patient presented to the ED with wound on left great toe. Patient was admitted with a diagnosis of cellulitis. Patient's care discussed in daily quality rounds. Patient reported to have had CT and MRI. Podiatry is following patient and ordered PICC Line for long term IV antibiotics. Podiatry also ordered ID to consult. Patient had PICC Line placed on this date. This  met with patient to discuss DC options. Patient reports plan to be admitted to [facility] unit for long term IV antibiotics given current orders for IV antibiotics. Patient reports concern about insurance coverage and out of pocket expenses. This  did review financial assistance available at [facility]. Patient reports interest in being notified what patient's out of pocket costs will be. This  updated [id number] whom plans to verify patient's insurance benefits before starting precert. [facility] RN House supervisor reports plan to notify patient the information obtained regarding coverage when verifying benefits.   SS to continue to follow

## 2023-03-08 NOTE — PROGRESS NOTES
Patient is feeling better. Blood pressure is improved, blood sugar is improved. Pain and discomfort in the left leg also had significantly improved. No new symptoms. General appearance: alert, appears stated age and cooperative,  Skin: Skin color, texture, turgor normal. No rashes or lesions  HEENT: Head: Normocephalic, no lesions, without obvious abnormality. Neck: no adenopathy, no carotid bruit, no JVD, supple, symmetrical, trachea midline, and thyroid not enlarged, symmetric, no tenderness/mass/nodules  Lungs: clear to auscultation bilaterally  Heart: regular rate and rhythm, S1, S2 normal, no murmur, click, rub or gallop  Abdomen: soft, non-tender; bowel sounds normal; no masses,  no organomegaly  Extremities: Significant improvement of the erythema involving the anterior, medial and the lateral left shin, dorsal foot and great toe. Stage II ulceration at least involving the plantar aspect of the left great toe measuring about half an inch in diameter. Neurologic: Mental status: Alert, oriented, thought content appropriate. *Infected left great toe ulcer with cellulitis extending into the dorsal aspect of the foot, medial ankle, anterior and medial shin area. CT did not show osteomyelitis. MRI showed evidence of osteomyelitis. Podiatrist recommended PICC line and infectious disease consultation. Continue Zosyn and vancomycin. Significant improvement and resolution of the erythema and induration involving the lower shin, ankle and dorsal foot. *Peripheral vascular disease. Peroneal artery stenosis versus occlusion. No clinical evidence to suggest acute limb ischemia. The foot is warm with faint dorsalis pedis pulse. Patient may need to have vascular evaluation to be done electively. *Newly diagnosed hypertension. Much better improved after the initiation of lisinopril 20 mg daily     *Newly diagnosed diabetes mellitus. Much improved after the initiation of oral medications. *Few other medical issues not listed above.

## 2023-03-08 NOTE — PROGRESS NOTES
Call placed to Infectious disease for consult which was called yesterday with no return call, Left message with answering service with consult.

## 2023-03-08 NOTE — DISCHARGE INSTRUCTIONS
Peripherally Inserted Central Catheter (PICC): Care Instructions  Your Care Instructions    Remember to carry your card for PICC. It is proof that the PICC line can be safely used for radiology procedures. A peripherally inserted central catheter (PICC) is a soft, flexible tube that runs under your skin from a vein in your arm to a large vein near your heart. One end of the catheter stays outside your body. It is a type of central venous catheter, or central venous line. You may have it for weeks or months. A PICC is used to give you medicine, blood products, nutrients, or fluids. A PICC makes doing these things more comfortable for you because they are put directly into the catheter. So you will not be stuck with a needle every time. A PICC also can be used to draw blood for tests. The end of the PICC sometimes has two or three openings so that you can get more than one type of fluid or medicine at a time. Your doctor may give you medicine to make you feel relaxed. You may feel a little pain when your doctor numbs your arm. Your doctor will then thread the catheter up a vein in your arm to a larger vein. You will not feel any pain. The doctor may use stitches or other devices to hold the catheter in place where it exits your arm. After the procedure, the site may be sore for a day or two. Follow-up care is a key part of your treatment and safety. Be sure to make and go to all appointments, and call your doctor if you are having problems. It's also a good idea to know your test results and keep a list of the medicines you take. How can you care for yourself at home? Do not lift anything heavier than 10-15lbs with affected arm. Do not perform any vigorous activity that involves affected arm; ie jumping jacks, push-ups, burpees etc.  Do not wear jewelry, such as necklaces, that can catch on the catheter. If the catheter breaks, follow the instructions your doctor gave you.  If you have no instructions, clamp or tie off the catheter. Then see a doctor as soon as possible. To help prevent infection, take a shower instead of a bath. Do not go swimming with the catheter. Try to keep the area dry. When you shower, cover the area with waterproof material, such as plastic wrap. Never touch the open end of the catheter if the cap is off. Never use scissors, knives, pins, or other sharp objects near the catheter or other tubing. If your catheter has a clamp, keep it clamped when you are not using it. Fasten or tape the catheter to your body to prevent pulling or dangling. Avoid clothing that rubs or pulls on your catheter. Avoid bending or crimping your catheter. Always wash your hands before you touch your catheter. Wear loose clothing over the catheter for the first 10 to 14 days. When getting dressed, be careful not to pull on the catheter. Dressing Changes  Your PICC dressing should be changed at least once a week. If the dressing becomes loose, wet, or dirty, it must be changed more often to prevent infection. Since the PICC is in one of your arms, you will not be able to change the dressing on your own. Your healthcare provider will have the required supplies needed to change your PICC dressing. These include medical tape, a surgical mask, sterile gloves, and your dressing kit. When should you call for help? Call 911 anytime you think you may need emergency care. For example, call if:    You passed out (lost consciousness). You have severe trouble breathing. You have sudden chest pain and shortness of breath, or you cough up blood. You have a fast or uneven pulse. Call your doctor now or seek immediate medical care if:    You have signs of infection, such as: Increased pain, swelling, warmth, or redness. Red streaks leading from the area. Pus or blood draining from the area. A fever. You have swelling in your face, chest, neck, or arm on the side where the catheter is. You have signs of a blood clot, such as bulging veins near the catheter. Your catheter is leaking, cracked, or clogged. You feel resistance when you inject medicine or fluids into your catheter. Your catheter is out of place. This may happen after severe coughing or vomiting, or if you pull on the catheter. You have chest pain or shortness of breath. Watch closely for changes in your health, and be sure to contact your doctor if:    You have any concerns about your catheter. Where can you learn more? Go to https://[employer]ate. org and sign in to your [employer] account. Enter M350 in the [employer] Biomolecular Determination box to learn more about \"Peripherally Inserted Central Catheter (PICC): Care Instructions. \"     Current as of: September 23, 2018  Content Version: 12.0  © 5301-5420 [employer]wise, Incorporated. Care instructions adapted under license by [facility] ([facility]). If you have questions about a medical condition or this instruction, always ask your healthcare professional. [place] [address] any warranty or liability for your use of this information.

## 2023-03-08 NOTE — PROGRESS NOTES
Comprehensive Nutrition Assessment    Type and Reason for Visit:  Reassess, Patient Education    Nutrition Recommendations/Plan:   Continue diet as ordered  Diet education, continue to follow up  Recommend outpatient nutrition education      Malnutrition Assessment:  Malnutrition Status:  No malnutrition (03/06/23 1421)        Nutrition Assessment:    No s/s of malnutrition. New DM diagnosis. Patient diabetes diet education provided on 3/6 and 3/8. Will continue to follow up while in house. Diet information and RD contact information provided. Pt verbalizes understanding of diet. Nutrition Related Findings:    Admitted related to cellulitis and new DM diagnosis. Admit weight 234#, BMI 33.7- obese. Labs reviewed 3/5 hemoglobin A1c 11.8. Accuchecks reviewed- remain high, wound may be contributing to elevated blood glucose. DM diet, 5 carbs per meal ordered. Skin- wound to left great toe noted. Meds reviewed. Pt reports fair appetite- feels medication is affecting appetite somewhat but overall eating well. Wound Type: Diabetic Ulcer       Current Nutrition Intake & Therapies:    Average Meal Intake: %  Average Supplements Intake: None Ordered  ADULT DIET; Regular; 5 carb choices (75 gm/meal)    Anthropometric Measures:  Height: 5' 10\" (177.8 cm)  Ideal Body Weight (IBW): 166 lbs (75 kg)    Admission Body Weight: 234 lb (106.1 kg)  Current Body Weight: 234 lb (106.1 kg) (3/5/23), 141 % IBW. Weight Source: Bed Scale  Current BMI (kg/m2): 33.6  BMI Categories: Obese Class 1 (BMI 30.0-34. 9)    Estimated Daily Nutrient Needs:  Energy Requirements Based On: Kcal/kg  Weight Used for Energy Requirements: Current  Energy (kcal/day): 6010-0802 (17-19kcal/kg)  Weight Used for Protein Requirements: Ideal  Protein (g/day): ~113gm  Method Used for Fluid Requirements: 1 ml/kcal  Fluid (ml/day): ~3552-7606    Nutrition Diagnosis:   Altered nutrition-related lab values related to endocrine dysfuntion as evidenced by lab values (new DM diagnosis.)  Food & Nutrition-related knowledge deficit related to endocrine dysfuntion as evidenced by other (comment) (new DM diagnosis)    Nutrition Interventions:   Food and/or Nutrient Delivery: Continue Current Diet  Nutrition Education/Counseling: Education completed  Coordination of Nutrition Care: Continue to monitor while inpatient       Goals:     Goals: PO intake 75% or greater       Nutrition Monitoring and Evaluation:      Food/Nutrient Intake Outcomes: Food and Nutrient Intake  Physical Signs/Symptoms Outcomes: Meal Time Behavior, Weight    Discharge Planning:    Continue current diet     Meena Dietz RD

## 2023-03-08 NOTE — CARE COORDINATION
PICC line will be inserted today, @ [facility], Special Procedures, at 11:00am. [facility] was held by Herminio Burnham RN.

## 2023-03-08 NOTE — CARE COORDINATION
[employer] called, benefits obtained and clinicals for precert faxed. Awaiting insurance's response.

## 2023-03-09 ENCOUNTER — HOSPITAL ENCOUNTER (INPATIENT)
Age: 63
LOS: 20 days | Discharge: ANOTHER HOSPITAL | DRG: 638 | End: 2023-03-29
Attending: INTERNAL MEDICINE | Admitting: INTERNAL MEDICINE
Payer: COMMERCIAL

## 2023-03-09 VITALS
WEIGHT: 234.2 LBS | HEART RATE: 59 BPM | DIASTOLIC BLOOD PRESSURE: 93 MMHG | SYSTOLIC BLOOD PRESSURE: 141 MMHG | RESPIRATION RATE: 18 BRPM | OXYGEN SATURATION: 100 % | BODY MASS INDEX: 33.53 KG/M2 | HEIGHT: 70 IN | TEMPERATURE: 97.9 F

## 2023-03-09 PROBLEM — M86.9 OSTEOMYELITIS OF GREAT TOE OF LEFT FOOT (HCC): Status: ACTIVE | Noted: 2023-03-09

## 2023-03-09 LAB
GLUCOSE BLD-MCNC: 101 MG/DL (ref 70–99)
GLUCOSE BLD-MCNC: 126 MG/DL (ref 70–99)
GLUCOSE BLD-MCNC: 129 MG/DL (ref 70–99)
GLUCOSE BLD-MCNC: 137 MG/DL (ref 70–99)
PERFORMED ON: ABNORMAL
VANCOMYCIN RANDOM: 15.2 UG/ML (ref 10–40)
WOUND/ABSCESS: NORMAL

## 2023-03-09 PROCEDURE — 2580000003 HC RX 258: Performed by: INTERNAL MEDICINE

## 2023-03-09 PROCEDURE — 99222 1ST HOSP IP/OBS MODERATE 55: CPT | Performed by: INTERNAL MEDICINE

## 2023-03-09 PROCEDURE — 36415 COLL VENOUS BLD VENIPUNCTURE: CPT

## 2023-03-09 PROCEDURE — 1200000002 HC SEMI PRIVATE SWING BED

## 2023-03-09 PROCEDURE — 6370000000 HC RX 637 (ALT 250 FOR IP): Performed by: INTERNAL MEDICINE

## 2023-03-09 PROCEDURE — 6360000002 HC RX W HCPCS: Performed by: INTERNAL MEDICINE

## 2023-03-09 PROCEDURE — 80202 ASSAY OF VANCOMYCIN: CPT

## 2023-03-09 RX ORDER — GLIPIZIDE 5 MG/1
5 TABLET ORAL
Status: CANCELLED | OUTPATIENT
Start: 2023-03-10

## 2023-03-09 RX ORDER — SODIUM CHLORIDE 0.9 % (FLUSH) 0.9 %
10 SYRINGE (ML) INJECTION PRN
Status: DISCONTINUED | OUTPATIENT
Start: 2023-03-09 | End: 2023-03-30 | Stop reason: HOSPADM

## 2023-03-09 RX ORDER — SODIUM CHLORIDE 0.9 % (FLUSH) 0.9 %
5-40 SYRINGE (ML) INJECTION PRN
Status: CANCELLED | OUTPATIENT
Start: 2023-03-09

## 2023-03-09 RX ORDER — SODIUM CHLORIDE 9 MG/ML
INJECTION, SOLUTION INTRAVENOUS PRN
Status: CANCELLED | OUTPATIENT
Start: 2023-03-09

## 2023-03-09 RX ORDER — INSULIN LISPRO 100 [IU]/ML
5 INJECTION, SOLUTION INTRAVENOUS; SUBCUTANEOUS
Status: DISCONTINUED | OUTPATIENT
Start: 2023-03-10 | End: 2023-03-10

## 2023-03-09 RX ORDER — LISINOPRIL 20 MG/1
20 TABLET ORAL DAILY
Status: CANCELLED | OUTPATIENT
Start: 2023-03-10

## 2023-03-09 RX ORDER — PROMETHAZINE HYDROCHLORIDE 12.5 MG/1
12.5 TABLET ORAL EVERY 6 HOURS PRN
Status: CANCELLED | OUTPATIENT
Start: 2023-03-09

## 2023-03-09 RX ORDER — SODIUM CHLORIDE 9 MG/ML
INJECTION, SOLUTION INTRAVENOUS PRN
Status: DISCONTINUED | OUTPATIENT
Start: 2023-03-09 | End: 2023-03-30 | Stop reason: HOSPADM

## 2023-03-09 RX ORDER — INSULIN LISPRO 100 [IU]/ML
0-4 INJECTION, SOLUTION INTRAVENOUS; SUBCUTANEOUS NIGHTLY
Status: DISCONTINUED | OUTPATIENT
Start: 2023-03-09 | End: 2023-03-12

## 2023-03-09 RX ORDER — ACETAMINOPHEN 325 MG/1
650 TABLET ORAL EVERY 6 HOURS PRN
Status: DISCONTINUED | OUTPATIENT
Start: 2023-03-09 | End: 2023-03-30 | Stop reason: HOSPADM

## 2023-03-09 RX ORDER — ACETAMINOPHEN 650 MG/1
650 SUPPOSITORY RECTAL EVERY 6 HOURS PRN
Status: DISCONTINUED | OUTPATIENT
Start: 2023-03-09 | End: 2023-03-30 | Stop reason: HOSPADM

## 2023-03-09 RX ORDER — GLIPIZIDE 5 MG/1
5 TABLET ORAL
Status: DISCONTINUED | OUTPATIENT
Start: 2023-03-10 | End: 2023-03-13

## 2023-03-09 RX ORDER — SODIUM CHLORIDE 0.9 % (FLUSH) 0.9 %
10 SYRINGE (ML) INJECTION PRN
Status: CANCELLED | OUTPATIENT
Start: 2023-03-09

## 2023-03-09 RX ORDER — SODIUM CHLORIDE 0.9 % (FLUSH) 0.9 %
5-40 SYRINGE (ML) INJECTION EVERY 12 HOURS SCHEDULED
Status: CANCELLED | OUTPATIENT
Start: 2023-03-09

## 2023-03-09 RX ORDER — PROMETHAZINE HYDROCHLORIDE 12.5 MG/1
12.5 TABLET ORAL EVERY 6 HOURS PRN
Status: DISCONTINUED | OUTPATIENT
Start: 2023-03-09 | End: 2023-03-30 | Stop reason: HOSPADM

## 2023-03-09 RX ORDER — LISINOPRIL 20 MG/1
20 TABLET ORAL DAILY
Status: DISCONTINUED | OUTPATIENT
Start: 2023-03-10 | End: 2023-03-18

## 2023-03-09 RX ORDER — INSULIN LISPRO 100 [IU]/ML
0-8 INJECTION, SOLUTION INTRAVENOUS; SUBCUTANEOUS
Status: DISCONTINUED | OUTPATIENT
Start: 2023-03-10 | End: 2023-03-10

## 2023-03-09 RX ORDER — ENOXAPARIN SODIUM 100 MG/ML
30 INJECTION SUBCUTANEOUS 2 TIMES DAILY
Status: CANCELLED | OUTPATIENT
Start: 2023-03-09

## 2023-03-09 RX ORDER — DEXTROSE MONOHYDRATE 100 MG/ML
INJECTION, SOLUTION INTRAVENOUS CONTINUOUS PRN
Status: DISCONTINUED | OUTPATIENT
Start: 2023-03-09 | End: 2023-03-30 | Stop reason: HOSPADM

## 2023-03-09 RX ORDER — ENOXAPARIN SODIUM 100 MG/ML
30 INJECTION SUBCUTANEOUS 2 TIMES DAILY
Status: DISCONTINUED | OUTPATIENT
Start: 2023-03-09 | End: 2023-03-30 | Stop reason: HOSPADM

## 2023-03-09 RX ORDER — INSULIN LISPRO 100 [IU]/ML
0-8 INJECTION, SOLUTION INTRAVENOUS; SUBCUTANEOUS
Status: CANCELLED | OUTPATIENT
Start: 2023-03-10

## 2023-03-09 RX ORDER — SODIUM CHLORIDE 0.9 % (FLUSH) 0.9 %
5-40 SYRINGE (ML) INJECTION EVERY 12 HOURS SCHEDULED
Status: DISCONTINUED | OUTPATIENT
Start: 2023-03-09 | End: 2023-03-30 | Stop reason: HOSPADM

## 2023-03-09 RX ORDER — INSULIN LISPRO 100 [IU]/ML
0-4 INJECTION, SOLUTION INTRAVENOUS; SUBCUTANEOUS NIGHTLY
Status: CANCELLED | OUTPATIENT
Start: 2023-03-09

## 2023-03-09 RX ORDER — SODIUM CHLORIDE 0.9 % (FLUSH) 0.9 %
5-40 SYRINGE (ML) INJECTION PRN
Status: DISCONTINUED | OUTPATIENT
Start: 2023-03-09 | End: 2023-03-30 | Stop reason: HOSPADM

## 2023-03-09 RX ORDER — ASPIRIN 81 MG/1
81 TABLET, CHEWABLE ORAL DAILY
Status: CANCELLED | OUTPATIENT
Start: 2023-03-10

## 2023-03-09 RX ORDER — SODIUM CHLORIDE 9 MG/ML
25 INJECTION, SOLUTION INTRAVENOUS PRN
Status: CANCELLED | OUTPATIENT
Start: 2023-03-09

## 2023-03-09 RX ORDER — ASPIRIN 81 MG/1
81 TABLET, CHEWABLE ORAL DAILY
Status: DISCONTINUED | OUTPATIENT
Start: 2023-03-10 | End: 2023-03-30 | Stop reason: HOSPADM

## 2023-03-09 RX ORDER — ONDANSETRON 2 MG/ML
4 INJECTION INTRAMUSCULAR; INTRAVENOUS EVERY 6 HOURS PRN
Status: DISCONTINUED | OUTPATIENT
Start: 2023-03-09 | End: 2023-03-30 | Stop reason: HOSPADM

## 2023-03-09 RX ORDER — ACETAMINOPHEN 325 MG/1
650 TABLET ORAL EVERY 6 HOURS PRN
Status: CANCELLED | OUTPATIENT
Start: 2023-03-09

## 2023-03-09 RX ORDER — ACETAMINOPHEN 650 MG/1
650 SUPPOSITORY RECTAL EVERY 6 HOURS PRN
Status: CANCELLED | OUTPATIENT
Start: 2023-03-09

## 2023-03-09 RX ORDER — ONDANSETRON 2 MG/ML
4 INJECTION INTRAMUSCULAR; INTRAVENOUS EVERY 6 HOURS PRN
Status: CANCELLED | OUTPATIENT
Start: 2023-03-09

## 2023-03-09 RX ORDER — INSULIN LISPRO 100 [IU]/ML
5 INJECTION, SOLUTION INTRAVENOUS; SUBCUTANEOUS
Status: CANCELLED | OUTPATIENT
Start: 2023-03-09

## 2023-03-09 RX ORDER — POLYETHYLENE GLYCOL 3350 17 G/17G
17 POWDER, FOR SOLUTION ORAL DAILY PRN
Status: CANCELLED | OUTPATIENT
Start: 2023-03-09

## 2023-03-09 RX ORDER — DEXTROSE MONOHYDRATE 100 MG/ML
INJECTION, SOLUTION INTRAVENOUS CONTINUOUS PRN
Status: CANCELLED | OUTPATIENT
Start: 2023-03-09

## 2023-03-09 RX ORDER — SODIUM CHLORIDE 9 MG/ML
25 INJECTION, SOLUTION INTRAVENOUS PRN
Status: DISCONTINUED | OUTPATIENT
Start: 2023-03-09 | End: 2023-03-30 | Stop reason: HOSPADM

## 2023-03-09 RX ORDER — POLYETHYLENE GLYCOL 3350 17 G/17G
17 POWDER, FOR SOLUTION ORAL DAILY PRN
Status: DISCONTINUED | OUTPATIENT
Start: 2023-03-09 | End: 2023-03-30 | Stop reason: HOSPADM

## 2023-03-09 RX ADMIN — ASPIRIN 81 MG CHEWABLE TABLET 81 MG: 81 TABLET CHEWABLE at 08:13

## 2023-03-09 RX ADMIN — LISINOPRIL 20 MG: 20 TABLET ORAL at 08:14

## 2023-03-09 RX ADMIN — METFORMIN HYDROCHLORIDE 500 MG: 500 TABLET ORAL at 17:40

## 2023-03-09 RX ADMIN — GLIPIZIDE 5 MG: 5 TABLET ORAL at 15:57

## 2023-03-09 RX ADMIN — Medication 10 ML: at 22:19

## 2023-03-09 RX ADMIN — INSULIN LISPRO 5 UNITS: 100 INJECTION, SOLUTION INTRAVENOUS; SUBCUTANEOUS at 08:15

## 2023-03-09 RX ADMIN — INSULIN LISPRO 5 UNITS: 100 INJECTION, SOLUTION INTRAVENOUS; SUBCUTANEOUS at 11:56

## 2023-03-09 RX ADMIN — GLIPIZIDE 5 MG: 5 TABLET ORAL at 08:14

## 2023-03-09 RX ADMIN — PIPERACILLIN AND TAZOBACTAM 3375 MG: 3; .375 INJECTION, POWDER, FOR SOLUTION INTRAVENOUS at 06:06

## 2023-03-09 RX ADMIN — PIPERACILLIN AND TAZOBACTAM 3375 MG: 3; .375 INJECTION, POWDER, LYOPHILIZED, FOR SOLUTION INTRAVENOUS at 22:21

## 2023-03-09 RX ADMIN — VANCOMYCIN HYDROCHLORIDE 1500 MG: 1 INJECTION, POWDER, LYOPHILIZED, FOR SOLUTION INTRAVENOUS at 10:53

## 2023-03-09 RX ADMIN — ENOXAPARIN SODIUM 30 MG: 100 INJECTION SUBCUTANEOUS at 08:14

## 2023-03-09 RX ADMIN — SODIUM HYPOCHLORITE: 2.5 SOLUTION TOPICAL at 10:17

## 2023-03-09 RX ADMIN — METFORMIN HYDROCHLORIDE 500 MG: 500 TABLET ORAL at 08:14

## 2023-03-09 RX ADMIN — ENOXAPARIN SODIUM 30 MG: 100 INJECTION SUBCUTANEOUS at 22:22

## 2023-03-09 RX ADMIN — PIPERACILLIN AND TAZOBACTAM 3375 MG: 3; .375 INJECTION, POWDER, FOR SOLUTION INTRAVENOUS at 14:44

## 2023-03-09 ASSESSMENT — ENCOUNTER SYMPTOMS
EYES NEGATIVE: 1
GASTROINTESTINAL NEGATIVE: 1
COLOR CHANGE: 1
RESPIRATORY NEGATIVE: 1

## 2023-03-09 NOTE — DISCHARGE SUMMARY
Hospital Medicine Discharge Summary    Kandice Naqvi  :  1960  MRN:  [id number]    Admit date:  3/5/2023  Discharge date:  3/9/2023    Admitting Physician:  Ye Manzo MD  Primary Care Physician:  Louisa Potter MD      Discharge Diagnoses:    *Osteomyelitis. Dr Mag Singer recommend 6 IV of IV Zosyn. *Infected left great toe ulcer with cellulitis extending into the dorsal aspect of the foot, medial ankle, anterior and medial shin area. CT did not show osteomyelitis. MRI showed evidence of osteomyelitis. Podiatrist recommended PICC line and infectious disease consultation. Continue Zosyn and vancomycin. Significant improvement and resolution of the erythema and induration involving the lower shin, ankle and dorsal foot. Patient had a PICC line as recommended by podiatry. Patient will be admitted to a skilled care for wound care and intravenous antibiotic if agreed on by infectious disease team.     *Peripheral vascular disease. Peroneal artery stenosis versus occlusion. No clinical evidence to suggest acute limb ischemia. The foot is warm with faint dorsalis pedis pulse. Patient may need to have vascular evaluation to be done electively. We will arrange for that. *Newly diagnosed hypertension. Much better improved after the initiation of lisinopril 20 mg daily     *Newly diagnosed diabetes mellitus. Much improved after the initiation of oral medications. *  Hospital Course:   Kandice Naqvi is a 57 y.o. male that was admitted and treated at [facility] for the aforementined medical issues. He had I/D by podiaty. MRI is + for Osteo. 6 weeks of IV Zosyn is recommended by ID.       Patient was seen by the following consultants while admitted to [facility]:   Consults:  IP CONSULT TO HOSPITALIST  IP CONSULT TO DIABETES EDUCATOR  IP CONSULT TO PHARMACY  IP CONSULT TO PODIATRY  IP CONSULT TO INFECTIOUS DISEASES  IP CONSULT TO INFECTIOUS DISEASES    Significant Diagnostic Studies:    IR PICC WO SQ PORT/PUMP > 5 YEARS    Result Date: 3/8/2023  EXAMINATION: IR PICC WO SQ PORT/PUMP > 5 YEARS DATE AND TIME:3/8/2023 11:02 AM CLINICAL HISTORY:  cellulitis  COMPARISON: None available. Radiation dose: 2.42 mGy. After discussing the procedure and possible complications with the patient, informed consent was obtained. The patient was placed on the Special Procedures table. The right upper extremity was sterilely prepared using 2% chlorhexidine and then draped with sterile towels and a body-sized sterile drape. All personnel in the Special Procedures Room donned caps and surgical masks. In addition, the  and first assistant donned sterile gowns and gloves after proper hand cleansing. A pre-procedure time out was performed in order to assure the correct patient and procedure. Local anesthetic was administered. A peripheral vein was accessed with sonographic guidance. A sonographic spot image was obtained for documentation. A guidewire was advanced into the vein with fluoroscopic guidance and a sheath was placed over the guidewire. A 5-[place] dual-lumen PICC was advanced through the sheath, into the brachial vein, up the arm and into the central vasculature. It was positioned appropriately. The sheath was removed. The catheter was shown to aspirate and infuse properly. The flange of the catheter was affixed to the arm using a PICC securement device. A spot image of the chest showed the tip of the PICC line to lie in the right atrium. The patient tolerated the procedure well and without complications. CONCLUSION: SUCCESSFUL PICC PLACEMENT WITHOUT IMMEDIATE COMPLICATIONS. Discharge Medications:         Medication List        START taking these medications      piperacillin-tazobactam  infusion  Commonly known as: ZOSYN  Infuse 3.375 g intravenously in the morning and 3.375 g at noon and 3.375 g in the evening. Compound per protocol. Jazzmine Marie Where to Get Your Medications        You can get these medications from any pharmacy    Bring a paper prescription for each of these medications  piperacillin-tazobactam  infusion         Disposition:   Discharged to skilled  Home. Any [facility] needs that were indicated and/or required as been addressed and set up by Social Work. Condition at discharge: Pt was medically stable at the time of discharge. Significant improvement in clinical condition compared to initial condition at presentation to hospital    Activity: activity as tolerated, fall precautions. Total time taken for discharging this patient: 40 minutes. Greater than 70% of time was spent focused exclusively on this patient. Time was taken to review chart, discuss plans with consultants, reconciling medications, discussing plan answering questions with patient. Julien Bullard MD  3/9/2023, 4:37 PM  ----------------------------------------------------------------------------------------------------------------------    Elliot Mina,     Please return to ER or call 911 if you develop any significant signs or symptoms. I may not have addressed all of your medical illnesses or the abnormal blood work or imaging therefore please ask your PCP Jim Coles MD and other out patient specialists and providers  to obtain [facility] record entirely to follow up on all of the abnormal labs, imaging and findings that I have and have not addressed during your hospitalization. Discharging you from the hospital does not mean that your medical care ends here and now. You may still need additional work up, investigation, monitoring, and treatment to be handled from this point on by outside providers including your PCP, Jim Coles MD , Specialists and other healthcare providers.       Please review your list of discharge medications prior to resuming medications you might still have at home, as the medications you need to be taking, dosages or how often you must take them may have changed. For medication questions, contact your retail pharmacy and your PCP, John Bolton MD .     ** I STRONGLY RECOMMEND that you follow up with John Bolton MD within 3 to 5 days for a post hospitalization evaluation. This specific office visit is covered by your insurance, and is not the same as your annual doctor visit/ check up. This office visit is important, as it may prevent need for repeat and/or future hospitalizations.**    Your medical team at [facility] appreciates the opportunity to work with you to get well!    Sincerely,  Enrike Chacon MD Follow up with Dr. John Bolton MD in the next 7 days or sooner if needed.    Please return to ER or call 911 if you develop any significant signs or symptoms.    I may or may not have addressed all of your symptoms, medical issues,  Illnesses, or all of the abnormal blood work or imaging therefore please ask your PCP and other specialists to obtain [facility] record entirely to follow up on all of your symptoms, illnesses, abnormal labs, imaging and findings that I have and have not addressed during your hospitalization.     Discharging you from the hospital does not mean that your medical care ends here and now. You may still need to have additional work up, investigation, monitoring, surveillance, and treatment to be handled from this point on by in the out patient setting by  out patient providers including your PCP, Specialists and other healthcare providers.     For medication questions, contact your retail pharmacy and your PCP.    Your medical team at [facility] appreciates the opportunity to work with you to get well!    Enrike Chacon MD

## 2023-03-09 NOTE — PROGRESS NOTES
No new symptoms since yesterday. Patient is feeling well. Improvement of erythema and induration involving the shin, ankle and dorsal foot. The persistent ulceration on the plantar aspect of the great toe. General appearance: alert, appears stated age and cooperative,  Skin: Skin color, texture, turgor normal. No rashes or lesions  HEENT: Head: Normocephalic, no lesions, without obvious abnormality. Neck: no adenopathy, no carotid bruit, no JVD, supple, symmetrical, trachea midline, and thyroid not enlarged, symmetric, no tenderness/mass/nodules  Lungs: clear to auscultation bilaterally  Heart: regular rate and rhythm, S1, S2 normal, no murmur, click, rub or gallop  Abdomen: soft, non-tender; bowel sounds normal; no masses,  no organomegaly  Extremities: Significant improvement of the erythema involving the anterior, medial and the lateral left shin, dorsal foot and great toe. Stage II ulceration at least involving the plantar aspect of the left great toe measuring about half an inch in diameter. Neurologic: Mental status: Alert, oriented, thought content appropriate. *Infected left great toe ulcer with cellulitis extending into the dorsal aspect of the foot, medial ankle, anterior and medial shin area. CT did not show osteomyelitis. MRI showed evidence of osteomyelitis. Podiatrist recommended PICC line and infectious disease consultation. Continue Zosyn and vancomycin. Significant improvement and resolution of the erythema and induration involving the lower shin, ankle and dorsal foot. Patient had a PICC line as recommended by podiatry. Patient will be admitted to a skilled care for wound care and intravenous antibiotic if agreed on by infectious disease team.     *Peripheral vascular disease. Peroneal artery stenosis versus occlusion. No clinical evidence to suggest acute limb ischemia. The foot is warm with faint dorsalis pedis pulse.   Patient may need to have vascular evaluation to be done electively. We will arrange for that. *Newly diagnosed hypertension. Much better improved after the initiation of lisinopril 20 mg daily     *Newly diagnosed diabetes mellitus. Much improved after the initiation of oral medications. *Few other medical issues not listed above.

## 2023-03-09 NOTE — PROGRESS NOTES
Spiritual Care Services     Summary of Visit:  Pt accepting of the need for prolonged treatment/hospitalization. Pt's sister , pt's brother Himanshu Tipton lives in [place]. Close to his sister's dtr, his niece Henry Kirkland, whom his parents raised. Pt's afia important to him, leaning in and seeking the potential spiritual meaning of this time. Encouragement provided. Encounter Summary  Encounter Overview/Reason : Spiritual/Emotional Needs, Grief, Loss, and Adjustments  Service Provided For[de-identified] Patient  Referral/Consult From[de-identified] Rounding  Support System: Family members  Last Encounter : 23  Complexity of Encounter: Low  Begin Time: 1500  End Time : 1540  Total Time Calculated: 40 min  Encounter   Type: Follow up     Spiritual/Emotional needs  Type: Spiritual Support     Grief, Loss, and Adjustments  Type: Life Adjustments                Spiritual Assessment/Intervention/Outcomes:    Assessment: Calm, Questioning afia    Intervention: Active listening, Discussed meaning/purpose, Discussed illness injury and its impact, Discussed belief system/Congregational practices/afia    Outcome: Receptive      Care Plan:    Plan and Referrals  Plan/Referrals: Continue Support (comment)    Spiritual Care Services   Electronically signed by Noreen Roe, [employer] on 3/9/2023 at 3:39 PM.    To reach a  for emotional and spiritual support, place an [facility] consult request.   If a  is needed immediately, dial 0 and ask to page the on-call .

## 2023-03-09 NOTE — PROGRESS NOTES
[facility] Pharmacokinetic Monitoring Service - Vancomycin    Consulting Provider: Dr. Hansa Echols   Indication: SSTI  Target Concentration: Goal AUC/ELTON 400-600 mg*hr/L  Day of Therapy: 4  Additional Antimicrobials: Zosyn    Pertinent Laboratory Values: Wt Readings from Last 1 Encounters:   03/05/23 234 lb 3.2 oz (106.2 kg)     Temp Readings from Last 1 Encounters:   03/09/23 97.9 °F (36.6 °C) (Oral)     Estimated Creatinine Clearance: 125 mL/min (based on SCr of 0.73 mg/dL). No results for input(s): CREATININE, WBC in the last 72 hours. Invalid input(s):  BUN  Procalcitonin: No results found for: PROCAL      Pertinent Cultures:  Procedure Component Value Units Date/Time   Culture, Wound Aerobic Only [[id number]] Collected: 03/06/23 1723   Order Status: Completed Specimen: Wound from Toe Updated: 03/09/23 0922    WOUND/ABSCESS --    Direct Exam:  RARE NEUTROPHILS   Direct Exam:  FEW GRAM POSITIVE COCCI IN PAIRS   Direct Exam:  RARE GRAM NEGATIVE RODS   Direct Exam:  RARE GRAM POSITIVE COCCI IN CLUSTERS   Cult,Wound:  NORMAL SKIN MARÍA   Performed at [facility] [address], [address]   ([phone]    Narrative:     ORDER#: [id number]                          ORDERED BY: Massiel Giordano   SOURCE: Toe                                COLLECTED:  03/06/23 17:23   ANTIBIOTICS AT [facility].:                      RECEIVED :  03/06/23 21:32   Culture, Wound [[id number]] (Abnormal)  Collected: 03/05/23 1824   Order Status: Completed Specimen: Toe Updated: 03/08/23 1029    WOUND/ABSCESS -- Abnormal     Direct Exam:  NO NEUTROPHILS SEEN   Direct Exam:  MIXED BACTERIAL MORPHOTYPES SEEN ON GRAM STAIN.    Performed at [address] [place] [address]   [phone]    Abnormal     Organism Enterococcus faecalis Abnormal     WOUND/ABSCESS HEAVY GROWTH    Organism Providencia rettgeri Abnormal     WOUND/ABSCESS LIGHT GROWTH   Narrative:     ORDER#: [id number] ORDERED BY: Rogelio Blackman   SOURCE: Toe                                COLLECTED:  03/05/23 18:24   ANTIBIOTICS AT [facility].:                      RECEIVED :  03/05/23 20:56   Blood Culture 1 [[id number]] Collected: 03/05/23 1604   Order Status: Completed Specimen: Blood Updated: 03/07/23 0815    Blood Culture, Routine No Growth to date. Any change in status will be called. Narrative:     ORDER#: [id number]                          ORDERED BY: Rogelio Blackman   SOURCE: Blood                              COLLECTED:  03/05/23 16:04   ANTIBIOTICS AT [facility].:                      RECEIVED :  03/05/23 20:55   Blood Culture 2 [[id number]] Collected: 03/05/23 1604   Order Status: Completed Specimen: Blood Updated: 03/07/23 0815    Blood Culture, Routine No Growth to date. Any change in status will be called. Narrative:     ORDER#: [id number]                          ORDERED BY: Rogelio Blackman   SOURCE: Blood                              COLLECTED:  03/05/23 16:04   ANTIBIOTICS AT [facility].:                      RECEIVED :  03/05/23 20:55   Culture, Anaerobic and Aerobic [[id number]] Collected: 03/05/23 1824   Order Status: Canceled Specimen: Toe    MRSA Nasal Swab: N/A. Non-respiratory infection.     Recent vancomycin administrations                     vancomycin (VANCOCIN) 1,500 mg in sodium chloride 0.9 % 500 mL IVPB (mg) 1,500 mg New Bag 03/09/23 1053     1,500 mg New Bag 03/08/23 2208     1,500 mg New Bag  0911     1,500 mg New Bag 03/07/23 2157    vancomycin (VANCOCIN) 1,250 mg in sodium chloride 0.9 % 250 mL IVPB (mg) 1,250 mg New Bag 03/07/23 1208     1,250 mg New Bag 03/06/23 2210     1,250 mg New Bag  1458                    Assessment:  Date/Time Current Dose Concentration Timing of Concentration (h) AUC   03/09/23 0609 1500 mg IVPB q12h 15.2 mg/L 8h 1m post dose 438 mg/L.hr   Note: Serum concentrations collected for AUC dosing may appear elevated if collected in close proximity to the dose administered, this is not necessarily an indication of toxicity    Plan:  Current dosing regimen is therapeutic  Continue current dose  Repeat vancomycin concentration ordered for 03/13/23 @ 0600   Pharmacy will continue to monitor patient and adjust therapy as indicated    Thank you for the consult,  Nicky Contreras [facility]  3/9/2023 12:50 PM

## 2023-03-09 NOTE — PROGRESS NOTES
The supervisor and nursing staff have called Infectious Diseases answering service with the consult today and yesterday.

## 2023-03-09 NOTE — CONSULTS
Infectious Disease     Patient Name: Saeid Crowell  Date: 3/9/2023  YOB: 1960  Medical Record Number: [id number]      Osteomyelitis left great toe        History of Present Illness:  Diabetes peripheral vascular disease hyperlipidemia      3/6/2023  Patient presented to hospital with pain swelling redness involving left great toe redness extending to foot and ankle  Has been taking an oral antibiotic which she obtained in urgent care without any improvement    Plain film did not show evidence of osteomyelitis but MRI showed increased marrow signal in the left great toe proximal and distal phalanx consistent with osteomyelitis        Patient has had an ulceration on the plantar aspect of left great toe for some time    No fevers chills sweats nausea vomiting diarrhea            MRI OF THE LEFT FOOT WITH AND WITHOUT CONTRAST, 3/7/2023 4:20 pm       TECHNIQUE:   Multiplanar multisequence MRI of the left foot was performed with and without   the administration of intravenous contrast.       COMPARISON:   None       HISTORY:   ORDERING SYSTEM PROVIDED HISTORY: oseomyelitis left great toe   TECHNOLOGIST PROVIDED HISTORY:   Reason for exam:->oseomyelitis left great toe   What is the area of interest?->Forefoot   What reading provider will be dictating this exam?->CRC       FINDINGS:   Limitations: Some imaging sequences degraded by motion artifact. Diffuse soft tissue swelling. Soft tissue ulceration of the medial aspect of   the great toe. Edema and enhancement in the great toe distal phalanx and   proximal phalanx suspicious for osteomyelitis given the adjacent soft tissue   ulceration. Moderate DJD of the 1st MTP joint. No acute fracture or dislocation. Intact Lisfranc ligament. No soft tissue abscess identified. 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            Impression   Abnormal marrow signal alteration and enhancement in the great toe proximal and distal phalanges suspicious for osteomyelitis given the adjacent soft   tissue ulceration. Findings most pronounced in the distal phalanx. 3/6/23 1723   WOUND/ABSCESS Direct Exam:  RARE NEUTROPHILS   Direct Exam:  FEW GRAM POSITIVE COCCI IN PAIRS   Direct Exam:  RARE GRAM NEGATIVE RODS   Direct Exam:  RARE GRAM POSITIVE COCCI IN CLUSTERS   Cult,Wound:  NORMAL SKIN MARÍA   Performed at [facility] [address] [address]     3/5/23 1824    WOUND/ABSCESS  Abnormal   Direct Exam:  NO NEUTROPHILS SEEN   Direct Exam:  MIXED BACTERIAL MORPHOTYPES SEEN ON GRAM STAIN. Performed at [address]   [phone]     Organism Enterococcus faecalis Abnormal     WOUND/ABSCESS HEAVY GROWTH    Organism Providencia rettgeri Abnormal     WOUND/ABSCESS LIGHT GROWTH    Resulting Agency [address] [facility]        Susceptibility    Enterococcus faecalis (1)    Antibiotic Interpretation Microscan  Method Status    ampicillin Sensitive <=2 mcg/mL BACTERIAL SUSCEPTIBILITY PANEL BY ELTON     vancomycin Sensitive 1 mcg/mL BACTERIAL SUSCEPTIBILITY PANEL BY ELTON       Providencia rettgeri (2)    Antibiotic Interpretation Microscan  Method Status    ampicillin Resistant 4 mcg/mL BACTERIAL SUSCEPTIBILITY PANEL BY ELTON     ceFAZolin Resistant >=64 mcg/mL BACTERIAL SUSCEPTIBILITY PANEL BY ELTON     cefTRIAXone Sensitive <=0.25 mcg/mL BACTERIAL SUSCEPTIBILITY PANEL BY ELTON     gentamicin Sensitive <=1 mcg/mL BACTERIAL SUSCEPTIBILITY PANEL BY ELTON     levofloxacin Sensitive 1 mcg/mL BACTERIAL SUSCEPTIBILITY PANEL BY ELTON     piperacillin-tazobactam Sensitive <=4 mcg/mL BACTERIAL SUSCEPTIBILITY PANEL BY ELTON     trimethoprim-sulfamethoxazole Sensitive <=20 mcg/mL BACTERIAL SUSCEPTIBILITY PANEL BY ELTON                    Review of Systems   Constitutional: Negative. HENT: Negative. Eyes: Negative. Respiratory: Negative. Cardiovascular:  Positive for leg swelling. Gastrointestinal: Negative. Endocrine: Negative. Genitourinary: Negative. Skin:  Positive for color change and wound. Neurological: Negative. Psychiatric/Behavioral: Negative. Review of Systems: All 14 review of systems negative other than as stated above    Social History     Tobacco Use    Smoking status: Never    Smokeless tobacco: Never   Substance Use Topics    Alcohol use: Not Currently     Alcohol/week: 1.0 standard drink     Types: 1 Glasses of wine per week    Drug use: Not Currently         History reviewed. No pertinent past medical history. History reviewed. No pertinent surgical history. No current facility-administered medications on file prior to encounter. No current outpatient medications on file prior to encounter. No Known Allergies      History reviewed. No pertinent family history. Physical Exam:      Physical Exam  Constitutional:       Appearance: He is not ill-appearing or diaphoretic. HENT:      Head: Normocephalic and atraumatic. Mouth/Throat:      Mouth: Mucous membranes are moist.      Pharynx: No oropharyngeal exudate or posterior oropharyngeal erythema. Eyes:      Extraocular Movements: Extraocular movements intact. Conjunctiva/sclera: Conjunctivae normal.      Pupils: Pupils are equal, round, and reactive to light. Neck:      Vascular: No carotid bruit. Cardiovascular:      Heart sounds: Normal heart sounds. No murmur heard. Pulmonary:      Effort: Pulmonary effort is normal. No respiratory distress. Breath sounds: Normal breath sounds. No stridor. No wheezing, rhonchi or rales. Chest:      Chest wall: No tenderness. Abdominal:      General: Abdomen is flat. Bowel sounds are normal. There is no distension. Palpations: Abdomen is soft. There is no mass. Tenderness: There is no abdominal tenderness. There is no right CVA tenderness, left CVA tenderness, guarding or rebound.       Hernia: No hernia is present. Musculoskeletal:         General: Swelling present. Cervical back: Normal range of motion and neck supple. No rigidity or tenderness. Left lower leg: Edema present. Comments: Left great toe with ulcer plantar aspect does not probe to bone  Redness swelling extending over the dorsum of the foot into the calf   Lymphadenopathy:      Cervical: No cervical adenopathy. Skin:     General: Skin is warm. Findings: Erythema present. Neurological:      General: No focal deficit present. Mental Status: He is oriented to person, place, and time. Blood pressure (!) 141/93, pulse 59, temperature 97.9 °F (36.6 °C), temperature source Oral, resp. rate 18, height 5' 10\" (1.778 m), weight 234 lb 3.2 oz (106.2 kg), SpO2 100 %.       .   Lab Results   Component Value Date    WBC 7.5 03/06/2023    HGB 13.8 03/06/2023    HCT 40.7 (L) 03/06/2023    MCV 88.7 03/06/2023     03/06/2023     Lab Results   Component Value Date/Time     03/06/2023 06:03 AM    K 3.8 03/06/2023 06:03 AM     03/06/2023 06:03 AM    CO2 23 03/06/2023 06:03 AM    BUN 11 03/06/2023 06:03 AM    CREATININE 0.73 03/06/2023 06:03 AM    GLUCOSE 246 03/06/2023 06:03 AM    CALCIUM 9.0 03/06/2023 06:03 AM           3/5/23 1603    CRP 0.0 - 5.0 mg/L 9.1 High        3/5/23 1603    Sed Rate 0 - 20 mm 22 High        ASSESSMENT:  Patient Active Problem List   Diagnosis    Cellulitis         PLAN:  Osteomyelitis left great toe  Based on MRI findings      No MRSA from cultures from the wound  Wound growing Enterococcus faecalis and procidentia    Currently on Zosyn vancomycin  Discontinue vancomycin plan to discharge patient on Zosyn for 6 weeks of therapy

## 2023-03-09 NOTE — PROGRESS NOTES
Patient alert and oriented, in bed eating breakfast. Talked with patient about plan of care that currently include antibiotics and awaiting decision of further orders from ID. Pt denies any pain. Is up as tolerated in room.

## 2023-03-09 NOTE — PROGRESS NOTES
Pt alert and oriented. Pt up and independent in room. Pt dressing changed on wound. Pt denies any pain or needs at this time. Will continue to monitor pt.   Electronically signed by Micaela Gardiner RN on 3/8/2023 at 9:33 PM

## 2023-03-10 PROBLEM — M86.9 OSTEOMYELITIS (HCC): Status: ACTIVE | Noted: 2023-03-10

## 2023-03-10 LAB
GLUCOSE BLD-MCNC: 100 MG/DL (ref 70–99)
GLUCOSE BLD-MCNC: 136 MG/DL (ref 70–99)
PERFORMED ON: ABNORMAL
PERFORMED ON: ABNORMAL

## 2023-03-10 PROCEDURE — 2580000003 HC RX 258: Performed by: INTERNAL MEDICINE

## 2023-03-10 PROCEDURE — 1200000002 HC SEMI PRIVATE SWING BED

## 2023-03-10 PROCEDURE — 6370000000 HC RX 637 (ALT 250 FOR IP): Performed by: INTERNAL MEDICINE

## 2023-03-10 PROCEDURE — 6360000002 HC RX W HCPCS: Performed by: INTERNAL MEDICINE

## 2023-03-10 RX ORDER — INSULIN LISPRO 100 [IU]/ML
0-8 INJECTION, SOLUTION INTRAVENOUS; SUBCUTANEOUS 2 TIMES DAILY WITH MEALS
Status: DISCONTINUED | OUTPATIENT
Start: 2023-03-10 | End: 2023-03-30 | Stop reason: HOSPADM

## 2023-03-10 RX ADMIN — ASPIRIN 81 MG CHEWABLE TABLET 81 MG: 81 TABLET CHEWABLE at 08:31

## 2023-03-10 RX ADMIN — GLIPIZIDE 5 MG: 5 TABLET ORAL at 16:16

## 2023-03-10 RX ADMIN — ENOXAPARIN SODIUM 30 MG: 100 INJECTION SUBCUTANEOUS at 21:51

## 2023-03-10 RX ADMIN — PIPERACILLIN AND TAZOBACTAM 3375 MG: 3; .375 INJECTION, POWDER, LYOPHILIZED, FOR SOLUTION INTRAVENOUS at 14:57

## 2023-03-10 RX ADMIN — SODIUM HYPOCHLORITE: 2.5 SOLUTION TOPICAL at 08:35

## 2023-03-10 RX ADMIN — ENOXAPARIN SODIUM 30 MG: 100 INJECTION SUBCUTANEOUS at 08:30

## 2023-03-10 RX ADMIN — METFORMIN HYDROCHLORIDE 500 MG: 500 TABLET ORAL at 17:25

## 2023-03-10 RX ADMIN — Medication 10 ML: at 21:54

## 2023-03-10 RX ADMIN — PIPERACILLIN AND TAZOBACTAM 3375 MG: 3; .375 INJECTION, POWDER, LYOPHILIZED, FOR SOLUTION INTRAVENOUS at 06:02

## 2023-03-10 RX ADMIN — PIPERACILLIN AND TAZOBACTAM 3375 MG: 3; .375 INJECTION, POWDER, LYOPHILIZED, FOR SOLUTION INTRAVENOUS at 21:50

## 2023-03-10 RX ADMIN — METFORMIN HYDROCHLORIDE 500 MG: 500 TABLET ORAL at 08:31

## 2023-03-10 RX ADMIN — GLIPIZIDE 5 MG: 5 TABLET ORAL at 08:31

## 2023-03-10 RX ADMIN — LISINOPRIL 20 MG: 20 TABLET ORAL at 08:31

## 2023-03-10 NOTE — H&P
and cooperative, General appearance: alert, appears stated age and cooperative,  Skin: Skin color, texture, turgor normal. No rashes or lesions  HEENT: Head: Normocephalic, no lesions, without obvious abnormality. Neck: no adenopathy, no carotid bruit, no JVD, supple, symmetrical, trachea midline, and thyroid not enlarged, symmetric, no tenderness/mass/nodules  Lungs: clear to auscultation bilaterally  Heart: regular rate and rhythm, S1, S2 normal, no murmur, click, rub or gallop  Abdomen: soft, non-tender; bowel sounds normal; no masses,  no organomegaly  Extremities: Significant improvement of the erythema involving the anterior, medial and the lateral left shin, dorsal foot and great toe. Stage II ulceration at least involving the plantar aspect of the left great toe measuring about half an inch in diameter. Neurologic: Mental status: Alert, oriented, thought content appropriate. No results for input(s): WBC, HGB, PLT in the last 72 hours. No results for input(s): NA, K, CL, CO2, BUN, CREATININE, GLUCOSE, AST, ALT, ALB, BILITOT, ALKPHOS in the last 72 hours. Troponin T: No results for input(s): TROPONINI in the last 72 hours. ABGs: No results found for: PHART, PO2ART, [id number]  INR: No results for input(s): INR in the last 72 hours. URINALYSIS:No results for input(s): NITRITE, COLORU, PHUR, LABCAST, WBCUA, RBCUA, MUCUS, TRICHOMONAS, YEAST, BACTERIA, CLARITYU, SPECGRAV, LEUKOCYTESUR, UROBILINOGEN, BILIRUBINUR, BLOODU, GLUCOSEU, AMORPHOUS in the last 72 hours. Invalid input(s): Vikas Lily  -----------------------------------------------------------------   No results found. Assessment and Plan     *Osteomyelitis of the left great toe. Dr Mark Monique recommend 6 IV of IV Zosyn. *Infected left great toe ulcer with cellulitis extending into the dorsal aspect of the foot, medial ankle, anterior and medial shin area. Continue IV antibiotic. *Peripheral vascular disease.   Peroneal artery stenosis

## 2023-03-11 LAB
BLOOD CULTURE, ROUTINE: NORMAL
BLOOD CULTURE, ROUTINE: NORMAL
C-REACTIVE PROTEIN, HIGH SENSITIVITY: 4.1 MG/L (ref 0–5)
GLUCOSE BLD-MCNC: 132 MG/DL (ref 70–99)
GLUCOSE BLD-MCNC: 146 MG/DL (ref 70–99)
PERFORMED ON: ABNORMAL
PERFORMED ON: ABNORMAL

## 2023-03-11 PROCEDURE — 2580000003 HC RX 258: Performed by: INTERNAL MEDICINE

## 2023-03-11 PROCEDURE — 6370000000 HC RX 637 (ALT 250 FOR IP): Performed by: INTERNAL MEDICINE

## 2023-03-11 PROCEDURE — 1200000002 HC SEMI PRIVATE SWING BED

## 2023-03-11 PROCEDURE — 86141 C-REACTIVE PROTEIN HS: CPT

## 2023-03-11 PROCEDURE — 6360000002 HC RX W HCPCS: Performed by: INTERNAL MEDICINE

## 2023-03-11 RX ADMIN — PIPERACILLIN AND TAZOBACTAM 3375 MG: 3; .375 INJECTION, POWDER, LYOPHILIZED, FOR SOLUTION INTRAVENOUS at 14:06

## 2023-03-11 RX ADMIN — Medication 10 ML: at 08:24

## 2023-03-11 RX ADMIN — LISINOPRIL 20 MG: 20 TABLET ORAL at 08:30

## 2023-03-11 RX ADMIN — METFORMIN HYDROCHLORIDE 500 MG: 500 TABLET ORAL at 16:57

## 2023-03-11 RX ADMIN — GLIPIZIDE 5 MG: 5 TABLET ORAL at 08:25

## 2023-03-11 RX ADMIN — SODIUM HYPOCHLORITE: 2.5 SOLUTION TOPICAL at 12:44

## 2023-03-11 RX ADMIN — ENOXAPARIN SODIUM 30 MG: 100 INJECTION SUBCUTANEOUS at 22:44

## 2023-03-11 RX ADMIN — GLIPIZIDE 5 MG: 5 TABLET ORAL at 16:57

## 2023-03-11 RX ADMIN — PIPERACILLIN AND TAZOBACTAM 3375 MG: 3; .375 INJECTION, POWDER, LYOPHILIZED, FOR SOLUTION INTRAVENOUS at 22:44

## 2023-03-11 RX ADMIN — METFORMIN HYDROCHLORIDE 500 MG: 500 TABLET ORAL at 08:25

## 2023-03-11 RX ADMIN — ASPIRIN 81 MG CHEWABLE TABLET 81 MG: 81 TABLET CHEWABLE at 08:25

## 2023-03-11 RX ADMIN — Medication 10 ML: at 21:30

## 2023-03-11 RX ADMIN — ENOXAPARIN SODIUM 30 MG: 100 INJECTION SUBCUTANEOUS at 08:25

## 2023-03-11 RX ADMIN — PIPERACILLIN AND TAZOBACTAM 3375 MG: 3; .375 INJECTION, POWDER, LYOPHILIZED, FOR SOLUTION INTRAVENOUS at 06:20

## 2023-03-11 ASSESSMENT — PAIN SCALES - GENERAL
PAINLEVEL_OUTOF10: 0

## 2023-03-12 LAB
GLUCOSE BLD-MCNC: 106 MG/DL (ref 70–99)
GLUCOSE BLD-MCNC: 118 MG/DL (ref 70–99)
PERFORMED ON: ABNORMAL
PERFORMED ON: ABNORMAL

## 2023-03-12 PROCEDURE — 2580000003 HC RX 258: Performed by: INTERNAL MEDICINE

## 2023-03-12 PROCEDURE — 6370000000 HC RX 637 (ALT 250 FOR IP): Performed by: INTERNAL MEDICINE

## 2023-03-12 PROCEDURE — 6360000002 HC RX W HCPCS: Performed by: INTERNAL MEDICINE

## 2023-03-12 PROCEDURE — 1200000002 HC SEMI PRIVATE SWING BED

## 2023-03-12 RX ADMIN — PIPERACILLIN AND TAZOBACTAM 3375 MG: 3; .375 INJECTION, POWDER, LYOPHILIZED, FOR SOLUTION INTRAVENOUS at 21:53

## 2023-03-12 RX ADMIN — ENOXAPARIN SODIUM 30 MG: 100 INJECTION SUBCUTANEOUS at 21:51

## 2023-03-12 RX ADMIN — METFORMIN HYDROCHLORIDE 500 MG: 500 TABLET ORAL at 08:37

## 2023-03-12 RX ADMIN — GLIPIZIDE 5 MG: 5 TABLET ORAL at 08:37

## 2023-03-12 RX ADMIN — LISINOPRIL 20 MG: 20 TABLET ORAL at 08:37

## 2023-03-12 RX ADMIN — Medication 10 ML: at 21:51

## 2023-03-12 RX ADMIN — PIPERACILLIN AND TAZOBACTAM 3375 MG: 3; .375 INJECTION, POWDER, LYOPHILIZED, FOR SOLUTION INTRAVENOUS at 13:51

## 2023-03-12 RX ADMIN — SODIUM HYPOCHLORITE: 2.5 SOLUTION TOPICAL at 08:40

## 2023-03-12 RX ADMIN — ENOXAPARIN SODIUM 30 MG: 100 INJECTION SUBCUTANEOUS at 08:37

## 2023-03-12 RX ADMIN — GLIPIZIDE 5 MG: 5 TABLET ORAL at 16:43

## 2023-03-12 RX ADMIN — ASPIRIN 81 MG CHEWABLE TABLET 81 MG: 81 TABLET CHEWABLE at 08:37

## 2023-03-12 RX ADMIN — PIPERACILLIN AND TAZOBACTAM 3375 MG: 3; .375 INJECTION, POWDER, LYOPHILIZED, FOR SOLUTION INTRAVENOUS at 05:51

## 2023-03-12 RX ADMIN — METFORMIN HYDROCHLORIDE 500 MG: 500 TABLET ORAL at 16:43

## 2023-03-13 LAB
ALBUMIN SERPL-MCNC: 3.8 G/DL (ref 3.5–4.6)
ALP BLD-CCNC: 54 U/L (ref 35–104)
ALT SERPL-CCNC: 58 U/L (ref 0–41)
ANION GAP SERPL CALCULATED.3IONS-SCNC: 12 MEQ/L (ref 9–15)
AST SERPL-CCNC: 48 U/L (ref 0–40)
BASOPHILS ABSOLUTE: 0 K/UL (ref 0–0.1)
BASOPHILS RELATIVE PERCENT: 0.6 % (ref 0.2–1.2)
BILIRUB SERPL-MCNC: 0.5 MG/DL (ref 0.2–0.7)
BILIRUBIN DIRECT: <0.2 MG/DL (ref 0–0.4)
BILIRUBIN, INDIRECT: ABNORMAL MG/DL (ref 0–0.6)
BUN BLDV-MCNC: 18 MG/DL (ref 8–23)
C-REACTIVE PROTEIN, HIGH SENSITIVITY: 3.5 MG/L (ref 0–5)
CALCIUM SERPL-MCNC: 9.3 MG/DL (ref 8.5–9.9)
CHLORIDE BLD-SCNC: 105 MEQ/L (ref 95–107)
CO2: 22 MEQ/L (ref 20–31)
CREAT SERPL-MCNC: 0.87 MG/DL (ref 0.7–1.2)
EOSINOPHILS ABSOLUTE: 0.2 K/UL (ref 0–0.5)
EOSINOPHILS RELATIVE PERCENT: 2.2 % (ref 0.8–7)
GFR SERPL CREATININE-BSD FRML MDRD: >60 ML/MIN/{1.73_M2}
GLUCOSE BLD-MCNC: 113 MG/DL (ref 70–99)
GLUCOSE BLD-MCNC: 132 MG/DL (ref 70–99)
GLUCOSE BLD-MCNC: 150 MG/DL (ref 70–99)
GLUCOSE BLD-MCNC: 92 MG/DL (ref 70–99)
HCT VFR BLD CALC: 44.3 % (ref 42–52)
HEMOGLOBIN: 14.7 G/DL (ref 13.7–17.5)
IMMATURE GRANULOCYTES #: 0 K/UL
IMMATURE GRANULOCYTES %: 0.4 %
LYMPHOCYTES ABSOLUTE: 2.7 K/UL (ref 1.3–3.6)
LYMPHOCYTES RELATIVE PERCENT: 37.9 %
MCH RBC QN AUTO: 29.8 PG (ref 25.7–32.2)
MCHC RBC AUTO-ENTMCNC: 33.2 % (ref 32.3–36.5)
MCV RBC AUTO: 89.9 FL (ref 79–92.2)
MONOCYTES ABSOLUTE: 0.5 K/UL (ref 0.3–0.8)
MONOCYTES RELATIVE PERCENT: 7.3 % (ref 5.3–12.2)
NEUTROPHILS ABSOLUTE: 3.7 K/UL (ref 1.8–5.4)
NEUTROPHILS RELATIVE PERCENT: 51.6 % (ref 34–67.9)
PDW BLD-RTO: 12.8 % (ref 11.6–14.4)
PERFORMED ON: ABNORMAL
PERFORMED ON: ABNORMAL
PERFORMED ON: NORMAL
PLATELET # BLD: 170 K/UL (ref 163–337)
POTASSIUM SERPL-SCNC: 3.9 MEQ/L (ref 3.4–4.9)
RBC # BLD: 4.93 M/UL (ref 4.63–6.08)
SODIUM BLD-SCNC: 139 MEQ/L (ref 135–144)
TOTAL PROTEIN: 6.8 G/DL (ref 6.3–8)
WBC # BLD: 7.1 K/UL (ref 4.2–9)

## 2023-03-13 PROCEDURE — 86141 C-REACTIVE PROTEIN HS: CPT

## 2023-03-13 PROCEDURE — 6370000000 HC RX 637 (ALT 250 FOR IP): Performed by: INTERNAL MEDICINE

## 2023-03-13 PROCEDURE — 1200000002 HC SEMI PRIVATE SWING BED

## 2023-03-13 PROCEDURE — 6360000002 HC RX W HCPCS: Performed by: INTERNAL MEDICINE

## 2023-03-13 PROCEDURE — 80048 BASIC METABOLIC PNL TOTAL CA: CPT

## 2023-03-13 PROCEDURE — 2580000003 HC RX 258: Performed by: INTERNAL MEDICINE

## 2023-03-13 PROCEDURE — 80076 HEPATIC FUNCTION PANEL: CPT

## 2023-03-13 PROCEDURE — 85025 COMPLETE CBC W/AUTO DIFF WBC: CPT

## 2023-03-13 RX ORDER — GLIPIZIDE 5 MG/1
2.5 TABLET ORAL
Status: DISCONTINUED | OUTPATIENT
Start: 2023-03-14 | End: 2023-03-30 | Stop reason: HOSPADM

## 2023-03-13 RX ADMIN — PIPERACILLIN AND TAZOBACTAM 3375 MG: 3; .375 INJECTION, POWDER, LYOPHILIZED, FOR SOLUTION INTRAVENOUS at 06:03

## 2023-03-13 RX ADMIN — LISINOPRIL 20 MG: 20 TABLET ORAL at 09:13

## 2023-03-13 RX ADMIN — Medication 10 ML: at 22:04

## 2023-03-13 RX ADMIN — METFORMIN HYDROCHLORIDE 500 MG: 500 TABLET ORAL at 09:13

## 2023-03-13 RX ADMIN — PIPERACILLIN AND TAZOBACTAM 3375 MG: 3; .375 INJECTION, POWDER, LYOPHILIZED, FOR SOLUTION INTRAVENOUS at 14:19

## 2023-03-13 RX ADMIN — GLIPIZIDE 5 MG: 5 TABLET ORAL at 09:13

## 2023-03-13 RX ADMIN — PIPERACILLIN AND TAZOBACTAM 3375 MG: 3; .375 INJECTION, POWDER, LYOPHILIZED, FOR SOLUTION INTRAVENOUS at 22:07

## 2023-03-13 RX ADMIN — ENOXAPARIN SODIUM 30 MG: 100 INJECTION SUBCUTANEOUS at 22:04

## 2023-03-13 RX ADMIN — SODIUM HYPOCHLORITE: 2.5 SOLUTION TOPICAL at 09:14

## 2023-03-13 RX ADMIN — ENOXAPARIN SODIUM 30 MG: 100 INJECTION SUBCUTANEOUS at 09:13

## 2023-03-13 RX ADMIN — ASPIRIN 81 MG CHEWABLE TABLET 81 MG: 81 TABLET CHEWABLE at 09:13

## 2023-03-14 LAB
GLUCOSE BLD-MCNC: 119 MG/DL (ref 70–99)
GLUCOSE BLD-MCNC: 126 MG/DL (ref 70–99)
GLUCOSE BLD-MCNC: 128 MG/DL (ref 70–99)
GLUCOSE BLD-MCNC: 180 MG/DL (ref 70–99)
PERFORMED ON: ABNORMAL

## 2023-03-14 PROCEDURE — 6370000000 HC RX 637 (ALT 250 FOR IP): Performed by: INTERNAL MEDICINE

## 2023-03-14 PROCEDURE — 2580000003 HC RX 258: Performed by: INTERNAL MEDICINE

## 2023-03-14 PROCEDURE — 1200000002 HC SEMI PRIVATE SWING BED

## 2023-03-14 PROCEDURE — 6360000002 HC RX W HCPCS: Performed by: INTERNAL MEDICINE

## 2023-03-14 RX ADMIN — PIPERACILLIN AND TAZOBACTAM 3375 MG: 3; .375 INJECTION, POWDER, LYOPHILIZED, FOR SOLUTION INTRAVENOUS at 06:11

## 2023-03-14 RX ADMIN — LISINOPRIL 20 MG: 20 TABLET ORAL at 09:06

## 2023-03-14 RX ADMIN — Medication 10 ML: at 14:26

## 2023-03-14 RX ADMIN — GLIPIZIDE 2.5 MG: 5 TABLET ORAL at 09:05

## 2023-03-14 RX ADMIN — SODIUM HYPOCHLORITE: 2.5 SOLUTION TOPICAL at 23:35

## 2023-03-14 RX ADMIN — METFORMIN HYDROCHLORIDE 500 MG: 500 TABLET ORAL at 09:05

## 2023-03-14 RX ADMIN — GLIPIZIDE 2.5 MG: 5 TABLET ORAL at 18:07

## 2023-03-14 RX ADMIN — PIPERACILLIN AND TAZOBACTAM 3375 MG: 3; .375 INJECTION, POWDER, LYOPHILIZED, FOR SOLUTION INTRAVENOUS at 22:49

## 2023-03-14 RX ADMIN — PIPERACILLIN AND TAZOBACTAM 3375 MG: 3; .375 INJECTION, POWDER, LYOPHILIZED, FOR SOLUTION INTRAVENOUS at 14:26

## 2023-03-14 RX ADMIN — METFORMIN HYDROCHLORIDE 500 MG: 500 TABLET ORAL at 18:07

## 2023-03-14 RX ADMIN — Medication 10 ML: at 22:37

## 2023-03-14 RX ADMIN — ENOXAPARIN SODIUM 30 MG: 100 INJECTION SUBCUTANEOUS at 22:33

## 2023-03-14 RX ADMIN — SODIUM HYPOCHLORITE: 2.5 SOLUTION TOPICAL at 09:07

## 2023-03-14 RX ADMIN — ASPIRIN 81 MG CHEWABLE TABLET 81 MG: 81 TABLET CHEWABLE at 09:04

## 2023-03-14 ASSESSMENT — PAIN SCALES - GENERAL: PAINLEVEL_OUTOF10: 0

## 2023-03-15 LAB
GLUCOSE BLD-MCNC: 106 MG/DL (ref 70–99)
GLUCOSE BLD-MCNC: 118 MG/DL (ref 70–99)
GLUCOSE BLD-MCNC: 150 MG/DL (ref 70–99)
PERFORMED ON: ABNORMAL

## 2023-03-15 PROCEDURE — 6360000002 HC RX W HCPCS: Performed by: INTERNAL MEDICINE

## 2023-03-15 PROCEDURE — 1200000002 HC SEMI PRIVATE SWING BED

## 2023-03-15 PROCEDURE — 2580000003 HC RX 258: Performed by: INTERNAL MEDICINE

## 2023-03-15 PROCEDURE — 6370000000 HC RX 637 (ALT 250 FOR IP): Performed by: INTERNAL MEDICINE

## 2023-03-15 RX ADMIN — GLIPIZIDE 2.5 MG: 5 TABLET ORAL at 16:14

## 2023-03-15 RX ADMIN — ENOXAPARIN SODIUM 30 MG: 100 INJECTION SUBCUTANEOUS at 20:14

## 2023-03-15 RX ADMIN — METFORMIN HYDROCHLORIDE 500 MG: 500 TABLET ORAL at 16:14

## 2023-03-15 RX ADMIN — SODIUM HYPOCHLORITE: 2.5 SOLUTION TOPICAL at 22:19

## 2023-03-15 RX ADMIN — LISINOPRIL 20 MG: 20 TABLET ORAL at 07:51

## 2023-03-15 RX ADMIN — PIPERACILLIN AND TAZOBACTAM 3375 MG: 3; .375 INJECTION, POWDER, LYOPHILIZED, FOR SOLUTION INTRAVENOUS at 13:59

## 2023-03-15 RX ADMIN — PIPERACILLIN AND TAZOBACTAM 3375 MG: 3; .375 INJECTION, POWDER, LYOPHILIZED, FOR SOLUTION INTRAVENOUS at 06:04

## 2023-03-15 RX ADMIN — ENOXAPARIN SODIUM 30 MG: 100 INJECTION SUBCUTANEOUS at 07:51

## 2023-03-15 RX ADMIN — Medication 10 ML: at 20:14

## 2023-03-15 RX ADMIN — SODIUM HYPOCHLORITE: 2.5 SOLUTION TOPICAL at 07:54

## 2023-03-15 RX ADMIN — ASPIRIN 81 MG CHEWABLE TABLET 81 MG: 81 TABLET CHEWABLE at 07:51

## 2023-03-15 RX ADMIN — METFORMIN HYDROCHLORIDE 500 MG: 500 TABLET ORAL at 07:51

## 2023-03-15 RX ADMIN — PIPERACILLIN AND TAZOBACTAM 3375 MG: 3; .375 INJECTION, POWDER, LYOPHILIZED, FOR SOLUTION INTRAVENOUS at 22:08

## 2023-03-15 RX ADMIN — GLIPIZIDE 2.5 MG: 5 TABLET ORAL at 07:51

## 2023-03-15 ASSESSMENT — PAIN SCALES - GENERAL
PAINLEVEL_OUTOF10: 0

## 2023-03-15 NOTE — PLAN OF CARE
Pt. Is in bed watching Tv. He is A/O/ x 4, Independent. He takes his po medications whole w/ water. No c/o pain at this time. Last BM 03/14/2023, no c/o constipation, abd pain, or diarrhea. Pt has a double lumen PICC, that is clean, dry, and intact. He is receiving ATB Zosyn therapy. Applied Daskins solution to 2x2, wet to dry, and wrapped foot w/ Kerlix Tiarra. Call light is w/ in reach. Pt is in bed resting comfortably. Will continue to monitor.

## 2023-03-16 LAB
ANION GAP SERPL CALCULATED.3IONS-SCNC: 12 MEQ/L (ref 9–15)
BASOPHILS # BLD: 0 K/UL (ref 0–0.1)
BASOPHILS NFR BLD: 0.5 % (ref 0.2–1.2)
BUN SERPL-MCNC: 26 MG/DL (ref 8–23)
CALCIUM SERPL-MCNC: 9.5 MG/DL (ref 8.5–9.9)
CHLORIDE SERPL-SCNC: 104 MEQ/L (ref 95–107)
CO2 SERPL-SCNC: 23 MEQ/L (ref 20–31)
CREAT SERPL-MCNC: 0.97 MG/DL (ref 0.7–1.2)
EOSINOPHIL # BLD: 0.2 K/UL (ref 0–0.5)
EOSINOPHIL NFR BLD: 2.3 % (ref 0.8–7)
ERYTHROCYTE [DISTWIDTH] IN BLOOD BY AUTOMATED COUNT: 12.8 % (ref 11.6–14.4)
GLUCOSE BLD-MCNC: 128 MG/DL (ref 70–99)
GLUCOSE BLD-MCNC: 141 MG/DL (ref 70–99)
GLUCOSE SERPL-MCNC: 178 MG/DL (ref 70–99)
HCT VFR BLD AUTO: 45.5 % (ref 42–52)
HGB BLD-MCNC: 15.1 G/DL (ref 13.7–17.5)
IMM GRANULOCYTES # BLD: 0 K/UL
IMM GRANULOCYTES NFR BLD: 0.3 %
LYMPHOCYTES # BLD: 1.9 K/UL (ref 1.3–3.6)
LYMPHOCYTES NFR BLD: 28.7 %
MCH RBC QN AUTO: 29.9 PG (ref 25.7–32.2)
MCHC RBC AUTO-ENTMCNC: 33.2 % (ref 32.3–36.5)
MCV RBC AUTO: 90.1 FL (ref 79–92.2)
MONOCYTES # BLD: 0.5 K/UL (ref 0.3–0.8)
MONOCYTES NFR BLD: 6.9 % (ref 5.3–12.2)
NEUTROPHILS # BLD: 4 K/UL (ref 1.8–5.4)
NEUTS SEG NFR BLD: 61.3 % (ref 34–67.9)
PERFORMED ON: ABNORMAL
PERFORMED ON: ABNORMAL
PLATELET # BLD AUTO: 190 K/UL (ref 163–337)
POTASSIUM SERPL-SCNC: 4.2 MEQ/L (ref 3.4–4.9)
RBC # BLD AUTO: 5.05 M/UL (ref 4.63–6.08)
SODIUM SERPL-SCNC: 139 MEQ/L (ref 135–144)
WBC # BLD AUTO: 6.5 K/UL (ref 4.2–9)

## 2023-03-16 PROCEDURE — 6370000000 HC RX 637 (ALT 250 FOR IP): Performed by: INTERNAL MEDICINE

## 2023-03-16 PROCEDURE — 2580000003 HC RX 258: Performed by: INTERNAL MEDICINE

## 2023-03-16 PROCEDURE — 1200000002 HC SEMI PRIVATE SWING BED

## 2023-03-16 PROCEDURE — 80048 BASIC METABOLIC PNL TOTAL CA: CPT

## 2023-03-16 PROCEDURE — 6360000002 HC RX W HCPCS: Performed by: INTERNAL MEDICINE

## 2023-03-16 PROCEDURE — 99232 SBSQ HOSP IP/OBS MODERATE 35: CPT | Performed by: INTERNAL MEDICINE

## 2023-03-16 PROCEDURE — 85025 COMPLETE CBC W/AUTO DIFF WBC: CPT

## 2023-03-16 RX ADMIN — ENOXAPARIN SODIUM 30 MG: 100 INJECTION SUBCUTANEOUS at 08:30

## 2023-03-16 RX ADMIN — METFORMIN HYDROCHLORIDE 500 MG: 500 TABLET ORAL at 08:30

## 2023-03-16 RX ADMIN — PIPERACILLIN AND TAZOBACTAM 3375 MG: 3; .375 INJECTION, POWDER, LYOPHILIZED, FOR SOLUTION INTRAVENOUS at 14:13

## 2023-03-16 RX ADMIN — SODIUM HYPOCHLORITE: 2.5 SOLUTION TOPICAL at 23:03

## 2023-03-16 RX ADMIN — Medication 10 ML: at 20:58

## 2023-03-16 RX ADMIN — GLIPIZIDE 2.5 MG: 5 TABLET ORAL at 08:30

## 2023-03-16 RX ADMIN — METFORMIN HYDROCHLORIDE 500 MG: 500 TABLET ORAL at 16:41

## 2023-03-16 RX ADMIN — ENOXAPARIN SODIUM 30 MG: 100 INJECTION SUBCUTANEOUS at 20:57

## 2023-03-16 RX ADMIN — LISINOPRIL 20 MG: 20 TABLET ORAL at 08:30

## 2023-03-16 RX ADMIN — ASPIRIN 81 MG CHEWABLE TABLET 81 MG: 81 TABLET CHEWABLE at 08:30

## 2023-03-16 RX ADMIN — PIPERACILLIN AND TAZOBACTAM 3375 MG: 3; .375 INJECTION, POWDER, LYOPHILIZED, FOR SOLUTION INTRAVENOUS at 06:25

## 2023-03-16 RX ADMIN — PIPERACILLIN AND TAZOBACTAM 3375 MG: 3; .375 INJECTION, POWDER, LYOPHILIZED, FOR SOLUTION INTRAVENOUS at 22:53

## 2023-03-16 RX ADMIN — GLIPIZIDE 2.5 MG: 5 TABLET ORAL at 16:41

## 2023-03-16 RX ADMIN — SODIUM HYPOCHLORITE: 2.5 SOLUTION TOPICAL at 08:33

## 2023-03-16 ASSESSMENT — PAIN SCALES - GENERAL: PAINLEVEL_OUTOF10: 0

## 2023-03-16 NOTE — CARE COORDINATION
I rounded with Dr Edwardo Serrano for V.V. with this patient. I removed dressing, so Dr Edwardo Serrano could assess the wound. Plan is to continue current IVATB therapy.

## 2023-03-17 LAB
GLUCOSE BLD-MCNC: 120 MG/DL (ref 70–99)
GLUCOSE BLD-MCNC: 90 MG/DL (ref 70–99)
PERFORMED ON: ABNORMAL
PERFORMED ON: NORMAL

## 2023-03-17 PROCEDURE — 1200000002 HC SEMI PRIVATE SWING BED

## 2023-03-17 PROCEDURE — 2580000003 HC RX 258: Performed by: INTERNAL MEDICINE

## 2023-03-17 PROCEDURE — 6370000000 HC RX 637 (ALT 250 FOR IP): Performed by: INTERNAL MEDICINE

## 2023-03-17 PROCEDURE — 6360000002 HC RX W HCPCS: Performed by: INTERNAL MEDICINE

## 2023-03-17 RX ORDER — AMMONIUM LACTATE 12 G/100G
LOTION TOPICAL 2 TIMES DAILY
Status: DISCONTINUED | OUTPATIENT
Start: 2023-03-17 | End: 2023-03-30 | Stop reason: HOSPADM

## 2023-03-17 RX ADMIN — ENOXAPARIN SODIUM 30 MG: 100 INJECTION SUBCUTANEOUS at 23:07

## 2023-03-17 RX ADMIN — ASPIRIN 81 MG CHEWABLE TABLET 81 MG: 81 TABLET CHEWABLE at 09:03

## 2023-03-17 RX ADMIN — PIPERACILLIN AND TAZOBACTAM 3375 MG: 3; .375 INJECTION, POWDER, LYOPHILIZED, FOR SOLUTION INTRAVENOUS at 14:28

## 2023-03-17 RX ADMIN — Medication: at 23:09

## 2023-03-17 RX ADMIN — LISINOPRIL 20 MG: 20 TABLET ORAL at 09:03

## 2023-03-17 RX ADMIN — SODIUM HYPOCHLORITE: 2.5 SOLUTION TOPICAL at 09:05

## 2023-03-17 RX ADMIN — METFORMIN HYDROCHLORIDE 500 MG: 500 TABLET ORAL at 09:03

## 2023-03-17 RX ADMIN — GLIPIZIDE 2.5 MG: 5 TABLET ORAL at 09:03

## 2023-03-17 RX ADMIN — PIPERACILLIN AND TAZOBACTAM 3375 MG: 3; .375 INJECTION, POWDER, LYOPHILIZED, FOR SOLUTION INTRAVENOUS at 23:25

## 2023-03-17 RX ADMIN — PIPERACILLIN AND TAZOBACTAM 3375 MG: 3; .375 INJECTION, POWDER, LYOPHILIZED, FOR SOLUTION INTRAVENOUS at 05:39

## 2023-03-17 NOTE — PLAN OF CARE
Pt. is in bed watching Tv. He is A/O/ x 4, Independent. He takes his po medications whole w/ water. No c/o pain at this time. Last BM 03/16/2023, no c/o constipation, abd pain, or diarrhea. Pt has a double lumen PICC, that is clean, dry, and intact. Pink and white Hubs w/ (+) blood return He is receiving ATB Zosyn therapy. Tx to Left great toe, Applied Daskins solution to 2x2, wet to dry, and wrapped foot w/ Kerlix Tiarra. Call light is w/ in reach. Pt is in bed resting comfortably. Will continue to monitor.

## 2023-03-18 LAB
GLUCOSE BLD-MCNC: 123 MG/DL (ref 70–99)
GLUCOSE BLD-MCNC: 95 MG/DL (ref 70–99)
PERFORMED ON: ABNORMAL
PERFORMED ON: NORMAL

## 2023-03-18 PROCEDURE — 6370000000 HC RX 637 (ALT 250 FOR IP): Performed by: INTERNAL MEDICINE

## 2023-03-18 PROCEDURE — 2580000003 HC RX 258: Performed by: INTERNAL MEDICINE

## 2023-03-18 PROCEDURE — 6360000002 HC RX W HCPCS: Performed by: INTERNAL MEDICINE

## 2023-03-18 PROCEDURE — 1200000002 HC SEMI PRIVATE SWING BED

## 2023-03-18 RX ORDER — LISINOPRIL 10 MG/1
10 TABLET ORAL DAILY
Status: DISCONTINUED | OUTPATIENT
Start: 2023-03-19 | End: 2023-03-29

## 2023-03-18 RX ADMIN — Medication: at 08:59

## 2023-03-18 RX ADMIN — METFORMIN HYDROCHLORIDE 500 MG: 500 TABLET ORAL at 08:57

## 2023-03-18 RX ADMIN — SODIUM HYPOCHLORITE: 2.5 SOLUTION TOPICAL at 08:59

## 2023-03-18 RX ADMIN — PIPERACILLIN AND TAZOBACTAM 3375 MG: 3; .375 INJECTION, POWDER, LYOPHILIZED, FOR SOLUTION INTRAVENOUS at 06:27

## 2023-03-18 RX ADMIN — GLIPIZIDE 2.5 MG: 5 TABLET ORAL at 08:57

## 2023-03-18 RX ADMIN — Medication 10 ML: at 20:21

## 2023-03-18 RX ADMIN — PIPERACILLIN AND TAZOBACTAM 3375 MG: 3; .375 INJECTION, POWDER, LYOPHILIZED, FOR SOLUTION INTRAVENOUS at 21:05

## 2023-03-18 RX ADMIN — Medication: at 20:20

## 2023-03-18 RX ADMIN — PIPERACILLIN AND TAZOBACTAM 3375 MG: 3; .375 INJECTION, POWDER, LYOPHILIZED, FOR SOLUTION INTRAVENOUS at 14:05

## 2023-03-18 RX ADMIN — ENOXAPARIN SODIUM 30 MG: 100 INJECTION SUBCUTANEOUS at 20:18

## 2023-03-18 RX ADMIN — ASPIRIN 81 MG CHEWABLE TABLET 81 MG: 81 TABLET CHEWABLE at 08:57

## 2023-03-18 RX ADMIN — LISINOPRIL 20 MG: 20 TABLET ORAL at 08:57

## 2023-03-19 LAB
GLUCOSE BLD-MCNC: 117 MG/DL (ref 70–99)
GLUCOSE BLD-MCNC: 93 MG/DL (ref 70–99)
PERFORMED ON: ABNORMAL
PERFORMED ON: NORMAL

## 2023-03-19 PROCEDURE — 1200000002 HC SEMI PRIVATE SWING BED

## 2023-03-19 PROCEDURE — 6370000000 HC RX 637 (ALT 250 FOR IP): Performed by: INTERNAL MEDICINE

## 2023-03-19 PROCEDURE — 6360000002 HC RX W HCPCS: Performed by: INTERNAL MEDICINE

## 2023-03-19 PROCEDURE — 2580000003 HC RX 258: Performed by: INTERNAL MEDICINE

## 2023-03-19 RX ADMIN — LISINOPRIL 10 MG: 10 TABLET ORAL at 09:34

## 2023-03-19 RX ADMIN — Medication 10 ML: at 20:48

## 2023-03-19 RX ADMIN — SODIUM HYPOCHLORITE: 2.5 SOLUTION TOPICAL at 09:35

## 2023-03-19 RX ADMIN — ASPIRIN 81 MG CHEWABLE TABLET 81 MG: 81 TABLET CHEWABLE at 09:34

## 2023-03-19 RX ADMIN — GLIPIZIDE 2.5 MG: 5 TABLET ORAL at 09:00

## 2023-03-19 RX ADMIN — PIPERACILLIN AND TAZOBACTAM 3375 MG: 3; .375 INJECTION, POWDER, LYOPHILIZED, FOR SOLUTION INTRAVENOUS at 06:19

## 2023-03-19 RX ADMIN — ENOXAPARIN SODIUM 30 MG: 100 INJECTION SUBCUTANEOUS at 09:34

## 2023-03-19 RX ADMIN — Medication: at 20:47

## 2023-03-19 RX ADMIN — Medication 10 ML: at 12:28

## 2023-03-19 RX ADMIN — METFORMIN HYDROCHLORIDE 500 MG: 500 TABLET ORAL at 09:34

## 2023-03-19 RX ADMIN — PIPERACILLIN AND TAZOBACTAM 3375 MG: 3; .375 INJECTION, POWDER, LYOPHILIZED, FOR SOLUTION INTRAVENOUS at 14:20

## 2023-03-19 RX ADMIN — ENOXAPARIN SODIUM 30 MG: 100 INJECTION SUBCUTANEOUS at 20:48

## 2023-03-19 RX ADMIN — PIPERACILLIN AND TAZOBACTAM 3375 MG: 3; .375 INJECTION, POWDER, LYOPHILIZED, FOR SOLUTION INTRAVENOUS at 22:46

## 2023-03-19 RX ADMIN — Medication: at 09:35

## 2023-03-19 ASSESSMENT — PAIN SCALES - GENERAL: PAINLEVEL_OUTOF10: 0

## 2023-03-19 NOTE — PLAN OF CARE
Problem: Discharge Planning  Goal: Discharge to home or other facility with appropriate resources  Outcome: Progressing  Flowsheets (Taken 3/18/2023 2029)  Discharge to home or other facility with appropriate resources: Identify barriers to discharge with patient and caregiver     Problem: Nutrition Deficit:  Goal: Optimize nutritional status  Outcome: Progressing

## 2023-03-20 LAB
ALBUMIN SERPL-MCNC: 3.7 G/DL (ref 3.5–4.6)
ALP SERPL-CCNC: 46 U/L (ref 35–104)
ALT SERPL-CCNC: 33 U/L (ref 0–41)
ANION GAP SERPL CALCULATED.3IONS-SCNC: 10 MEQ/L (ref 9–15)
AST SERPL-CCNC: 17 U/L (ref 0–40)
BASOPHILS # BLD: 0 K/UL (ref 0–0.1)
BASOPHILS NFR BLD: 0.6 % (ref 0.2–1.2)
BILIRUB DIRECT SERPL-MCNC: <0.2 MG/DL (ref 0–0.4)
BILIRUB INDIRECT SERPL-MCNC: NORMAL MG/DL (ref 0–0.6)
BILIRUB SERPL-MCNC: 0.4 MG/DL (ref 0.2–0.7)
BUN SERPL-MCNC: 23 MG/DL (ref 8–23)
C-REACTIVE PROTEIN, HIGH SENSITIVITY: 2.9 MG/L (ref 0–5)
CALCIUM SERPL-MCNC: 9 MG/DL (ref 8.5–9.9)
CHLORIDE SERPL-SCNC: 105 MEQ/L (ref 95–107)
CO2 SERPL-SCNC: 23 MEQ/L (ref 20–31)
CREAT SERPL-MCNC: 0.85 MG/DL (ref 0.7–1.2)
EOSINOPHIL # BLD: 0.3 K/UL (ref 0–0.5)
EOSINOPHIL NFR BLD: 4.5 % (ref 0.8–7)
ERYTHROCYTE [DISTWIDTH] IN BLOOD BY AUTOMATED COUNT: 12.7 % (ref 11.6–14.4)
GLUCOSE BLD-MCNC: 106 MG/DL (ref 70–99)
GLUCOSE BLD-MCNC: 132 MG/DL (ref 70–99)
GLUCOSE SERPL-MCNC: 148 MG/DL (ref 70–99)
HCT VFR BLD AUTO: 41.7 % (ref 42–52)
HGB BLD-MCNC: 13.9 G/DL (ref 13.7–17.5)
IMM GRANULOCYTES # BLD: 0 K/UL
IMM GRANULOCYTES NFR BLD: 0.4 %
LYMPHOCYTES # BLD: 2.1 K/UL (ref 1.3–3.6)
LYMPHOCYTES NFR BLD: 31.1 %
MCH RBC QN AUTO: 30 PG (ref 25.7–32.2)
MCHC RBC AUTO-ENTMCNC: 33.3 % (ref 32.3–36.5)
MCV RBC AUTO: 89.9 FL (ref 79–92.2)
MONOCYTES # BLD: 0.5 K/UL (ref 0.3–0.8)
MONOCYTES NFR BLD: 7.8 % (ref 5.3–12.2)
NEUTROPHILS # BLD: 3.8 K/UL (ref 1.8–5.4)
NEUTS SEG NFR BLD: 55.6 % (ref 34–67.9)
PERFORMED ON: ABNORMAL
PERFORMED ON: ABNORMAL
PLATELET # BLD AUTO: 172 K/UL (ref 163–337)
POTASSIUM SERPL-SCNC: 4 MEQ/L (ref 3.4–4.9)
PROT SERPL-MCNC: 6.4 G/DL (ref 6.3–8)
RBC # BLD AUTO: 4.64 M/UL (ref 4.63–6.08)
SODIUM SERPL-SCNC: 138 MEQ/L (ref 135–144)
WBC # BLD AUTO: 6.9 K/UL (ref 4.2–9)

## 2023-03-20 PROCEDURE — 6370000000 HC RX 637 (ALT 250 FOR IP): Performed by: INTERNAL MEDICINE

## 2023-03-20 PROCEDURE — 85025 COMPLETE CBC W/AUTO DIFF WBC: CPT

## 2023-03-20 PROCEDURE — 1200000002 HC SEMI PRIVATE SWING BED

## 2023-03-20 PROCEDURE — 86141 C-REACTIVE PROTEIN HS: CPT

## 2023-03-20 PROCEDURE — 80048 BASIC METABOLIC PNL TOTAL CA: CPT

## 2023-03-20 PROCEDURE — 6360000002 HC RX W HCPCS: Performed by: INTERNAL MEDICINE

## 2023-03-20 PROCEDURE — 80076 HEPATIC FUNCTION PANEL: CPT

## 2023-03-20 PROCEDURE — 2580000003 HC RX 258: Performed by: INTERNAL MEDICINE

## 2023-03-20 RX ORDER — L. ACIDOPHILUS/L.BULGARICUS 1MM CELL
2 TABLET ORAL 3 TIMES DAILY
Status: DISCONTINUED | OUTPATIENT
Start: 2023-03-20 | End: 2023-03-30 | Stop reason: HOSPADM

## 2023-03-20 RX ADMIN — PIPERACILLIN AND TAZOBACTAM 3375 MG: 3; .375 INJECTION, POWDER, LYOPHILIZED, FOR SOLUTION INTRAVENOUS at 14:44

## 2023-03-20 RX ADMIN — Medication 2 TABLET: at 20:24

## 2023-03-20 RX ADMIN — ENOXAPARIN SODIUM 30 MG: 100 INJECTION SUBCUTANEOUS at 08:03

## 2023-03-20 RX ADMIN — Medication 10 ML: at 20:26

## 2023-03-20 RX ADMIN — GLIPIZIDE 2.5 MG: 5 TABLET ORAL at 08:03

## 2023-03-20 RX ADMIN — PIPERACILLIN AND TAZOBACTAM 3375 MG: 3; .375 INJECTION, POWDER, LYOPHILIZED, FOR SOLUTION INTRAVENOUS at 21:58

## 2023-03-20 RX ADMIN — PIPERACILLIN AND TAZOBACTAM 3375 MG: 3; .375 INJECTION, POWDER, LYOPHILIZED, FOR SOLUTION INTRAVENOUS at 06:15

## 2023-03-20 RX ADMIN — METFORMIN HYDROCHLORIDE 500 MG: 500 TABLET ORAL at 08:03

## 2023-03-20 RX ADMIN — ASPIRIN 81 MG CHEWABLE TABLET 81 MG: 81 TABLET CHEWABLE at 08:03

## 2023-03-20 RX ADMIN — Medication 2 TABLET: at 17:38

## 2023-03-20 RX ADMIN — Medication: at 08:02

## 2023-03-20 RX ADMIN — ENOXAPARIN SODIUM 30 MG: 100 INJECTION SUBCUTANEOUS at 20:25

## 2023-03-20 RX ADMIN — Medication 2 TABLET: at 12:49

## 2023-03-20 RX ADMIN — LISINOPRIL 10 MG: 10 TABLET ORAL at 08:03

## 2023-03-20 RX ADMIN — Medication: at 20:26

## 2023-03-20 RX ADMIN — Medication 10 ML: at 08:03

## 2023-03-20 RX ADMIN — SODIUM HYPOCHLORITE: 2.5 SOLUTION TOPICAL at 08:03

## 2023-03-20 ASSESSMENT — PAIN SCALES - GENERAL
PAINLEVEL_OUTOF10: 0
PAINLEVEL_OUTOF10: 0

## 2023-03-21 LAB
GLUCOSE BLD-MCNC: 121 MG/DL (ref 70–99)
GLUCOSE BLD-MCNC: 124 MG/DL (ref 70–99)
GLUCOSE BLD-MCNC: 127 MG/DL (ref 70–99)
PERFORMED ON: ABNORMAL

## 2023-03-21 PROCEDURE — 1200000002 HC SEMI PRIVATE SWING BED

## 2023-03-21 PROCEDURE — 6370000000 HC RX 637 (ALT 250 FOR IP): Performed by: INTERNAL MEDICINE

## 2023-03-21 PROCEDURE — 2580000003 HC RX 258: Performed by: INTERNAL MEDICINE

## 2023-03-21 PROCEDURE — 6360000002 HC RX W HCPCS: Performed by: INTERNAL MEDICINE

## 2023-03-21 RX ADMIN — LISINOPRIL 10 MG: 10 TABLET ORAL at 09:08

## 2023-03-21 RX ADMIN — GLIPIZIDE 2.5 MG: 5 TABLET ORAL at 09:08

## 2023-03-21 RX ADMIN — Medication 2 TABLET: at 21:07

## 2023-03-21 RX ADMIN — Medication 10 ML: at 21:18

## 2023-03-21 RX ADMIN — Medication 2 TABLET: at 09:08

## 2023-03-21 RX ADMIN — METFORMIN HYDROCHLORIDE 500 MG: 500 TABLET ORAL at 17:40

## 2023-03-21 RX ADMIN — ENOXAPARIN SODIUM 30 MG: 100 INJECTION SUBCUTANEOUS at 09:08

## 2023-03-21 RX ADMIN — PIPERACILLIN AND TAZOBACTAM 3375 MG: 3; .375 INJECTION, POWDER, LYOPHILIZED, FOR SOLUTION INTRAVENOUS at 06:00

## 2023-03-21 RX ADMIN — Medication: at 09:09

## 2023-03-21 RX ADMIN — GLIPIZIDE 2.5 MG: 5 TABLET ORAL at 17:40

## 2023-03-21 RX ADMIN — METFORMIN HYDROCHLORIDE 500 MG: 500 TABLET ORAL at 09:08

## 2023-03-21 RX ADMIN — ASPIRIN 81 MG CHEWABLE TABLET 81 MG: 81 TABLET CHEWABLE at 09:08

## 2023-03-21 RX ADMIN — PIPERACILLIN AND TAZOBACTAM 3375 MG: 3; .375 INJECTION, POWDER, LYOPHILIZED, FOR SOLUTION INTRAVENOUS at 14:44

## 2023-03-21 RX ADMIN — PIPERACILLIN AND TAZOBACTAM 3375 MG: 3; .375 INJECTION, POWDER, LYOPHILIZED, FOR SOLUTION INTRAVENOUS at 21:19

## 2023-03-21 RX ADMIN — Medication: at 21:07

## 2023-03-21 RX ADMIN — Medication 2 TABLET: at 14:44

## 2023-03-21 RX ADMIN — SODIUM HYPOCHLORITE: 2.5 SOLUTION TOPICAL at 09:10

## 2023-03-21 ASSESSMENT — PAIN SCALES - GENERAL: PAINLEVEL_OUTOF10: 0

## 2023-03-22 LAB
GLUCOSE BLD-MCNC: 121 MG/DL (ref 70–99)
GLUCOSE BLD-MCNC: 129 MG/DL (ref 70–99)
PERFORMED ON: ABNORMAL
PERFORMED ON: ABNORMAL

## 2023-03-22 PROCEDURE — 6360000002 HC RX W HCPCS: Performed by: INTERNAL MEDICINE

## 2023-03-22 PROCEDURE — 1200000002 HC SEMI PRIVATE SWING BED

## 2023-03-22 PROCEDURE — 2580000003 HC RX 258: Performed by: INTERNAL MEDICINE

## 2023-03-22 PROCEDURE — 6370000000 HC RX 637 (ALT 250 FOR IP): Performed by: INTERNAL MEDICINE

## 2023-03-22 RX ADMIN — SODIUM HYPOCHLORITE: 2.5 SOLUTION TOPICAL at 11:13

## 2023-03-22 RX ADMIN — ENOXAPARIN SODIUM 30 MG: 100 INJECTION SUBCUTANEOUS at 21:38

## 2023-03-22 RX ADMIN — PIPERACILLIN AND TAZOBACTAM 3375 MG: 3; .375 INJECTION, POWDER, LYOPHILIZED, FOR SOLUTION INTRAVENOUS at 06:11

## 2023-03-22 RX ADMIN — Medication 2 TABLET: at 14:40

## 2023-03-22 RX ADMIN — Medication 2 TABLET: at 21:39

## 2023-03-22 RX ADMIN — ENOXAPARIN SODIUM 30 MG: 100 INJECTION SUBCUTANEOUS at 11:14

## 2023-03-22 RX ADMIN — METFORMIN HYDROCHLORIDE 500 MG: 500 TABLET ORAL at 16:32

## 2023-03-22 RX ADMIN — Medication: at 11:13

## 2023-03-22 RX ADMIN — Medication: at 21:43

## 2023-03-22 RX ADMIN — GLIPIZIDE 2.5 MG: 5 TABLET ORAL at 16:32

## 2023-03-22 RX ADMIN — ASPIRIN 81 MG CHEWABLE TABLET 81 MG: 81 TABLET CHEWABLE at 11:15

## 2023-03-22 RX ADMIN — LISINOPRIL 10 MG: 10 TABLET ORAL at 11:15

## 2023-03-22 RX ADMIN — METFORMIN HYDROCHLORIDE 500 MG: 500 TABLET ORAL at 11:16

## 2023-03-22 RX ADMIN — Medication 10 ML: at 21:39

## 2023-03-22 RX ADMIN — GLIPIZIDE 2.5 MG: 5 TABLET ORAL at 11:15

## 2023-03-22 RX ADMIN — Medication 2 TABLET: at 11:15

## 2023-03-22 RX ADMIN — PIPERACILLIN AND TAZOBACTAM 3375 MG: 3; .375 INJECTION, POWDER, LYOPHILIZED, FOR SOLUTION INTRAVENOUS at 14:40

## 2023-03-22 RX ADMIN — PIPERACILLIN AND TAZOBACTAM 3375 MG: 3; .375 INJECTION, POWDER, LYOPHILIZED, FOR SOLUTION INTRAVENOUS at 21:41

## 2023-03-22 ASSESSMENT — PAIN SCALES - GENERAL: PAINLEVEL_OUTOF10: 0

## 2023-03-22 NOTE — PLAN OF CARE
Nutrition Problem #1: Altered nutrition-related lab values  Intervention: Food and/or Nutrient Delivery: Continue Current Diet  Nutritional

## 2023-03-23 LAB
ANION GAP SERPL CALCULATED.3IONS-SCNC: 9 MEQ/L (ref 9–15)
BASOPHILS # BLD: 0 K/UL (ref 0–0.1)
BASOPHILS NFR BLD: 0.4 % (ref 0.2–1.2)
BUN SERPL-MCNC: 26 MG/DL (ref 8–23)
CALCIUM SERPL-MCNC: 9.1 MG/DL (ref 8.5–9.9)
CHLORIDE SERPL-SCNC: 105 MEQ/L (ref 95–107)
CO2 SERPL-SCNC: 23 MEQ/L (ref 20–31)
CREAT SERPL-MCNC: 1.01 MG/DL (ref 0.7–1.2)
EOSINOPHIL # BLD: 0.5 K/UL (ref 0–0.5)
EOSINOPHIL NFR BLD: 6.2 % (ref 0.8–7)
ERYTHROCYTE [DISTWIDTH] IN BLOOD BY AUTOMATED COUNT: 12.9 % (ref 11.6–14.4)
GLUCOSE BLD-MCNC: 120 MG/DL (ref 70–99)
GLUCOSE BLD-MCNC: 128 MG/DL (ref 70–99)
GLUCOSE BLD-MCNC: 187 MG/DL (ref 70–99)
GLUCOSE SERPL-MCNC: 137 MG/DL (ref 70–99)
HCT VFR BLD AUTO: 41.3 % (ref 42–52)
HGB BLD-MCNC: 13.8 G/DL (ref 13.7–17.5)
IMM GRANULOCYTES # BLD: 0 K/UL
IMM GRANULOCYTES NFR BLD: 0.4 %
LYMPHOCYTES # BLD: 1.6 K/UL (ref 1.3–3.6)
LYMPHOCYTES NFR BLD: 22.2 %
MCH RBC QN AUTO: 29.8 PG (ref 25.7–32.2)
MCHC RBC AUTO-ENTMCNC: 33.4 % (ref 32.3–36.5)
MCV RBC AUTO: 89.2 FL (ref 79–92.2)
MONOCYTES # BLD: 0.8 K/UL (ref 0.3–0.8)
MONOCYTES NFR BLD: 10.1 % (ref 5.3–12.2)
NEUTROPHILS # BLD: 4.5 K/UL (ref 1.8–5.4)
NEUTS SEG NFR BLD: 60.7 % (ref 34–67.9)
PERFORMED ON: ABNORMAL
PLATELET # BLD AUTO: 169 K/UL (ref 163–337)
POTASSIUM SERPL-SCNC: 4.2 MEQ/L (ref 3.4–4.9)
RBC # BLD AUTO: 4.63 M/UL (ref 4.63–6.08)
SODIUM SERPL-SCNC: 137 MEQ/L (ref 135–144)
WBC # BLD AUTO: 7.4 K/UL (ref 4.2–9)

## 2023-03-23 PROCEDURE — 6370000000 HC RX 637 (ALT 250 FOR IP): Performed by: INTERNAL MEDICINE

## 2023-03-23 PROCEDURE — 85025 COMPLETE CBC W/AUTO DIFF WBC: CPT

## 2023-03-23 PROCEDURE — 1200000002 HC SEMI PRIVATE SWING BED

## 2023-03-23 PROCEDURE — 2580000003 HC RX 258: Performed by: INTERNAL MEDICINE

## 2023-03-23 PROCEDURE — 80048 BASIC METABOLIC PNL TOTAL CA: CPT

## 2023-03-23 PROCEDURE — 6360000002 HC RX W HCPCS: Performed by: INTERNAL MEDICINE

## 2023-03-23 RX ADMIN — METFORMIN HYDROCHLORIDE 500 MG: 500 TABLET ORAL at 08:48

## 2023-03-23 RX ADMIN — Medication 2 TABLET: at 20:15

## 2023-03-23 RX ADMIN — LISINOPRIL 10 MG: 10 TABLET ORAL at 08:48

## 2023-03-23 RX ADMIN — PIPERACILLIN AND TAZOBACTAM 3375 MG: 3; .375 INJECTION, POWDER, LYOPHILIZED, FOR SOLUTION INTRAVENOUS at 05:32

## 2023-03-23 RX ADMIN — Medication 2 TABLET: at 14:17

## 2023-03-23 RX ADMIN — Medication 2 TABLET: at 08:48

## 2023-03-23 RX ADMIN — Medication: at 20:04

## 2023-03-23 RX ADMIN — SODIUM HYPOCHLORITE: 2.5 SOLUTION TOPICAL at 08:48

## 2023-03-23 RX ADMIN — GLIPIZIDE 2.5 MG: 5 TABLET ORAL at 08:48

## 2023-03-23 RX ADMIN — Medication 10 ML: at 20:23

## 2023-03-23 RX ADMIN — PIPERACILLIN AND TAZOBACTAM 3375 MG: 3; .375 INJECTION, POWDER, LYOPHILIZED, FOR SOLUTION INTRAVENOUS at 14:19

## 2023-03-23 RX ADMIN — ASPIRIN 81 MG CHEWABLE TABLET 81 MG: 81 TABLET CHEWABLE at 08:48

## 2023-03-23 RX ADMIN — ENOXAPARIN SODIUM 30 MG: 100 INJECTION SUBCUTANEOUS at 20:04

## 2023-03-23 RX ADMIN — Medication: at 08:48

## 2023-03-23 RX ADMIN — METFORMIN HYDROCHLORIDE 500 MG: 500 TABLET ORAL at 16:43

## 2023-03-23 RX ADMIN — ENOXAPARIN SODIUM 30 MG: 100 INJECTION SUBCUTANEOUS at 08:47

## 2023-03-23 RX ADMIN — GLIPIZIDE 2.5 MG: 5 TABLET ORAL at 16:43

## 2023-03-23 RX ADMIN — PIPERACILLIN AND TAZOBACTAM 3375 MG: 3; .375 INJECTION, POWDER, LYOPHILIZED, FOR SOLUTION INTRAVENOUS at 20:16

## 2023-03-24 LAB
GLUCOSE BLD-MCNC: 102 MG/DL (ref 70–99)
GLUCOSE BLD-MCNC: 117 MG/DL (ref 70–99)
PERFORMED ON: ABNORMAL
PERFORMED ON: ABNORMAL

## 2023-03-24 PROCEDURE — 6360000002 HC RX W HCPCS: Performed by: INTERNAL MEDICINE

## 2023-03-24 PROCEDURE — 2580000003 HC RX 258: Performed by: INTERNAL MEDICINE

## 2023-03-24 PROCEDURE — 6370000000 HC RX 637 (ALT 250 FOR IP): Performed by: INTERNAL MEDICINE

## 2023-03-24 PROCEDURE — 1200000002 HC SEMI PRIVATE SWING BED

## 2023-03-24 RX ADMIN — PIPERACILLIN AND TAZOBACTAM 3375 MG: 3; .375 INJECTION, POWDER, LYOPHILIZED, FOR SOLUTION INTRAVENOUS at 14:23

## 2023-03-24 RX ADMIN — ASPIRIN 81 MG CHEWABLE TABLET 81 MG: 81 TABLET CHEWABLE at 07:46

## 2023-03-24 RX ADMIN — PIPERACILLIN AND TAZOBACTAM 3375 MG: 3; .375 INJECTION, POWDER, LYOPHILIZED, FOR SOLUTION INTRAVENOUS at 22:10

## 2023-03-24 RX ADMIN — Medication 10 ML: at 07:48

## 2023-03-24 RX ADMIN — Medication: at 07:46

## 2023-03-24 RX ADMIN — Medication 10 ML: at 22:08

## 2023-03-24 RX ADMIN — SODIUM HYPOCHLORITE: 2.5 SOLUTION TOPICAL at 07:48

## 2023-03-24 RX ADMIN — LISINOPRIL 10 MG: 10 TABLET ORAL at 07:46

## 2023-03-24 RX ADMIN — Medication 2 TABLET: at 20:40

## 2023-03-24 RX ADMIN — ENOXAPARIN SODIUM 30 MG: 100 INJECTION SUBCUTANEOUS at 20:41

## 2023-03-24 RX ADMIN — Medication: at 20:41

## 2023-03-24 RX ADMIN — PIPERACILLIN AND TAZOBACTAM 3375 MG: 3; .375 INJECTION, POWDER, LYOPHILIZED, FOR SOLUTION INTRAVENOUS at 05:43

## 2023-03-24 RX ADMIN — Medication 2 TABLET: at 14:23

## 2023-03-24 RX ADMIN — GLIPIZIDE 2.5 MG: 5 TABLET ORAL at 07:45

## 2023-03-24 RX ADMIN — METFORMIN HYDROCHLORIDE 500 MG: 500 TABLET ORAL at 07:46

## 2023-03-24 RX ADMIN — Medication 2 TABLET: at 07:45

## 2023-03-24 NOTE — PLAN OF CARE
Problem: Discharge Planning  Goal: Discharge to home or other facility with appropriate resources  3/24/2023 0014 by Betsy Shukla RN  Outcome: Progressing  3/23/2023 1439 by Marlin Dowling RN  Outcome: Progressing     Problem: Nutrition Deficit:  Goal: Optimize nutritional status  3/24/2023 0014 by Betsy Shukla RN  Outcome: Progressing  3/23/2023 1439 by Marlin Dowling RN  Outcome: Progressing     Problem: Safety - Adult  Goal: Free from fall injury  3/24/2023 0014 by Betsy Shukla RN  Outcome: Progressing  3/23/2023 1439 by Marlin Dowling RN  Outcome: Progressing

## 2023-03-25 LAB
GLUCOSE BLD-MCNC: 109 MG/DL (ref 70–99)
GLUCOSE BLD-MCNC: 144 MG/DL (ref 70–99)
PERFORMED ON: ABNORMAL
PERFORMED ON: ABNORMAL

## 2023-03-25 PROCEDURE — 1200000002 HC SEMI PRIVATE SWING BED

## 2023-03-25 PROCEDURE — 6370000000 HC RX 637 (ALT 250 FOR IP): Performed by: INTERNAL MEDICINE

## 2023-03-25 PROCEDURE — 2580000003 HC RX 258: Performed by: INTERNAL MEDICINE

## 2023-03-25 PROCEDURE — 6360000002 HC RX W HCPCS: Performed by: INTERNAL MEDICINE

## 2023-03-25 RX ADMIN — GLIPIZIDE 2.5 MG: 5 TABLET ORAL at 09:08

## 2023-03-25 RX ADMIN — PIPERACILLIN AND TAZOBACTAM 3375 MG: 3; .375 INJECTION, POWDER, LYOPHILIZED, FOR SOLUTION INTRAVENOUS at 14:54

## 2023-03-25 RX ADMIN — Medication: at 21:45

## 2023-03-25 RX ADMIN — ASPIRIN 81 MG CHEWABLE TABLET 81 MG: 81 TABLET CHEWABLE at 09:08

## 2023-03-25 RX ADMIN — Medication 2 TABLET: at 09:08

## 2023-03-25 RX ADMIN — Medication 10 ML: at 21:45

## 2023-03-25 RX ADMIN — GLIPIZIDE 2.5 MG: 5 TABLET ORAL at 16:44

## 2023-03-25 RX ADMIN — METFORMIN HYDROCHLORIDE 500 MG: 500 TABLET ORAL at 16:44

## 2023-03-25 RX ADMIN — Medication 2 TABLET: at 21:41

## 2023-03-25 RX ADMIN — Medication: at 09:10

## 2023-03-25 RX ADMIN — PIPERACILLIN AND TAZOBACTAM 3375 MG: 3; .375 INJECTION, POWDER, LYOPHILIZED, FOR SOLUTION INTRAVENOUS at 21:44

## 2023-03-25 RX ADMIN — SODIUM HYPOCHLORITE: 2.5 SOLUTION TOPICAL at 09:11

## 2023-03-25 RX ADMIN — METFORMIN HYDROCHLORIDE 500 MG: 500 TABLET ORAL at 09:08

## 2023-03-25 RX ADMIN — LISINOPRIL 10 MG: 10 TABLET ORAL at 09:07

## 2023-03-25 RX ADMIN — Medication 10 ML: at 09:12

## 2023-03-25 RX ADMIN — PIPERACILLIN AND TAZOBACTAM 3375 MG: 3; .375 INJECTION, POWDER, LYOPHILIZED, FOR SOLUTION INTRAVENOUS at 05:55

## 2023-03-25 RX ADMIN — Medication 2 TABLET: at 14:54

## 2023-03-25 NOTE — PLAN OF CARE
Pt. Is in his bed watching tv. He is A/O/ x 4, Independent. Takes his po medications whole. No c/o pain at this time. Pt has a Right arm double lumen PICC, that is clean, dry, and intact w/ (+) blood return. DSG on Left great toe is clean, dry, and intact. He is on ATB Zosyn therapy. Pt. Wears a Boot and surgical shoe when out of bed. Call light is w/in reach. He is in bed resting comfortably. Will continue to monitor.

## 2023-03-26 LAB
GLUCOSE BLD-MCNC: 103 MG/DL (ref 70–99)
GLUCOSE BLD-MCNC: 121 MG/DL (ref 70–99)
PERFORMED ON: ABNORMAL
PERFORMED ON: ABNORMAL

## 2023-03-26 PROCEDURE — 2580000003 HC RX 258: Performed by: INTERNAL MEDICINE

## 2023-03-26 PROCEDURE — 6370000000 HC RX 637 (ALT 250 FOR IP): Performed by: INTERNAL MEDICINE

## 2023-03-26 PROCEDURE — 1200000002 HC SEMI PRIVATE SWING BED

## 2023-03-26 PROCEDURE — 6360000002 HC RX W HCPCS: Performed by: INTERNAL MEDICINE

## 2023-03-26 RX ADMIN — PIPERACILLIN AND TAZOBACTAM 3375 MG: 3; .375 INJECTION, POWDER, LYOPHILIZED, FOR SOLUTION INTRAVENOUS at 06:15

## 2023-03-26 RX ADMIN — Medication 2 TABLET: at 21:06

## 2023-03-26 RX ADMIN — METFORMIN HYDROCHLORIDE 500 MG: 500 TABLET ORAL at 08:40

## 2023-03-26 RX ADMIN — Medication: at 20:54

## 2023-03-26 RX ADMIN — Medication 10 ML: at 08:43

## 2023-03-26 RX ADMIN — GLIPIZIDE 2.5 MG: 5 TABLET ORAL at 08:40

## 2023-03-26 RX ADMIN — METFORMIN HYDROCHLORIDE 500 MG: 500 TABLET ORAL at 17:44

## 2023-03-26 RX ADMIN — Medication: at 20:55

## 2023-03-26 RX ADMIN — Medication: at 08:42

## 2023-03-26 RX ADMIN — LISINOPRIL 10 MG: 10 TABLET ORAL at 08:40

## 2023-03-26 RX ADMIN — GLIPIZIDE 2.5 MG: 5 TABLET ORAL at 17:44

## 2023-03-26 RX ADMIN — SODIUM HYPOCHLORITE: 2.5 SOLUTION TOPICAL at 08:42

## 2023-03-26 RX ADMIN — ASPIRIN 81 MG CHEWABLE TABLET 81 MG: 81 TABLET CHEWABLE at 08:39

## 2023-03-26 RX ADMIN — Medication 2 TABLET: at 08:40

## 2023-03-26 RX ADMIN — PIPERACILLIN AND TAZOBACTAM 3375 MG: 3; .375 INJECTION, POWDER, LYOPHILIZED, FOR SOLUTION INTRAVENOUS at 14:47

## 2023-03-26 RX ADMIN — Medication 10 ML: at 20:53

## 2023-03-26 RX ADMIN — PIPERACILLIN AND TAZOBACTAM 3375 MG: 3; .375 INJECTION, POWDER, LYOPHILIZED, FOR SOLUTION INTRAVENOUS at 21:08

## 2023-03-26 RX ADMIN — Medication 2 TABLET: at 14:47

## 2023-03-27 ENCOUNTER — APPOINTMENT (OUTPATIENT)
Dept: ULTRASOUND IMAGING | Age: 63
DRG: 638 | End: 2023-03-27
Attending: INTERNAL MEDICINE
Payer: COMMERCIAL

## 2023-03-27 LAB
ALBUMIN SERPL-MCNC: 3.3 G/DL (ref 3.5–4.6)
ALP SERPL-CCNC: 48 U/L (ref 35–104)
ALT SERPL-CCNC: 16 U/L (ref 0–41)
ANION GAP SERPL CALCULATED.3IONS-SCNC: 11 MEQ/L (ref 9–15)
AST SERPL-CCNC: 12 U/L (ref 0–40)
BASOPHILS # BLD: 0 K/UL (ref 0–0.1)
BASOPHILS NFR BLD: 0.4 % (ref 0.2–1.2)
BILIRUB DIRECT SERPL-MCNC: <0.2 MG/DL (ref 0–0.4)
BILIRUB INDIRECT SERPL-MCNC: ABNORMAL MG/DL (ref 0–0.6)
BILIRUB SERPL-MCNC: 0.4 MG/DL (ref 0.2–0.7)
BUN SERPL-MCNC: 32 MG/DL (ref 8–23)
C-REACTIVE PROTEIN, HIGH SENSITIVITY: 100.4 MG/L (ref 0–5)
CALCIUM SERPL-MCNC: 8.8 MG/DL (ref 8.5–9.9)
CHLORIDE SERPL-SCNC: 102 MEQ/L (ref 95–107)
CO2 SERPL-SCNC: 23 MEQ/L (ref 20–31)
CREAT SERPL-MCNC: 2.06 MG/DL (ref 0.7–1.2)
CREAT UR-MCNC: 177.7 MG/DL
EOSINOPHIL # BLD: 0.4 K/UL (ref 0–0.5)
EOSINOPHIL NFR BLD: 5.1 % (ref 0.8–7)
EOSINOPHIL,URINE: PRESENT
ERYTHROCYTE [DISTWIDTH] IN BLOOD BY AUTOMATED COUNT: 12.8 % (ref 11.6–14.4)
GLUCOSE BLD-MCNC: 106 MG/DL (ref 70–99)
GLUCOSE BLD-MCNC: 113 MG/DL (ref 70–99)
GLUCOSE SERPL-MCNC: 113 MG/DL (ref 70–99)
HCT VFR BLD AUTO: 38.3 % (ref 42–52)
HGB BLD-MCNC: 12.9 G/DL (ref 13.7–17.5)
IMM GRANULOCYTES # BLD: 0 K/UL
IMM GRANULOCYTES NFR BLD: 0.4 %
LYMPHOCYTES # BLD: 1.2 K/UL (ref 1.3–3.6)
LYMPHOCYTES NFR BLD: 14.6 %
MCH RBC QN AUTO: 29.8 PG (ref 25.7–32.2)
MCHC RBC AUTO-ENTMCNC: 33.7 % (ref 32.3–36.5)
MCV RBC AUTO: 88.5 FL (ref 79–92.2)
MONOCYTES # BLD: 0.9 K/UL (ref 0.3–0.8)
MONOCYTES NFR BLD: 10.9 % (ref 5.3–12.2)
NEUTROPHILS # BLD: 5.7 K/UL (ref 1.8–5.4)
NEUTS SEG NFR BLD: 68.6 % (ref 34–67.9)
PERFORMED ON: ABNORMAL
PERFORMED ON: ABNORMAL
PLATELET # BLD AUTO: 151 K/UL (ref 163–337)
POTASSIUM SERPL-SCNC: 3.9 MEQ/L (ref 3.4–4.9)
PROT SERPL-MCNC: 6.2 G/DL (ref 6.3–8)
RBC # BLD AUTO: 4.33 M/UL (ref 4.63–6.08)
SODIUM SERPL-SCNC: 136 MEQ/L (ref 135–144)
SODIUM UR-SCNC: 36 MEQ/L
WBC # BLD AUTO: 8.3 K/UL (ref 4.2–9)

## 2023-03-27 PROCEDURE — 80048 BASIC METABOLIC PNL TOTAL CA: CPT

## 2023-03-27 PROCEDURE — 82570 ASSAY OF URINE CREATININE: CPT

## 2023-03-27 PROCEDURE — 87205 SMEAR GRAM STAIN: CPT

## 2023-03-27 PROCEDURE — 84300 ASSAY OF URINE SODIUM: CPT

## 2023-03-27 PROCEDURE — 85025 COMPLETE CBC W/AUTO DIFF WBC: CPT

## 2023-03-27 PROCEDURE — 2580000003 HC RX 258: Performed by: INTERNAL MEDICINE

## 2023-03-27 PROCEDURE — 81003 URINALYSIS AUTO W/O SCOPE: CPT

## 2023-03-27 PROCEDURE — 6370000000 HC RX 637 (ALT 250 FOR IP): Performed by: INTERNAL MEDICINE

## 2023-03-27 PROCEDURE — 1200000002 HC SEMI PRIVATE SWING BED

## 2023-03-27 PROCEDURE — 86141 C-REACTIVE PROTEIN HS: CPT

## 2023-03-27 PROCEDURE — 80076 HEPATIC FUNCTION PANEL: CPT

## 2023-03-27 PROCEDURE — 6360000002 HC RX W HCPCS: Performed by: INTERNAL MEDICINE

## 2023-03-27 PROCEDURE — 76775 US EXAM ABDO BACK WALL LIM: CPT

## 2023-03-27 PROCEDURE — 99232 SBSQ HOSP IP/OBS MODERATE 35: CPT | Performed by: INTERNAL MEDICINE

## 2023-03-27 RX ORDER — LEVOFLOXACIN 5 MG/ML
250 INJECTION, SOLUTION INTRAVENOUS EVERY 24 HOURS
Status: DISCONTINUED | OUTPATIENT
Start: 2023-03-27 | End: 2023-03-29 | Stop reason: SDUPTHER

## 2023-03-27 RX ORDER — SODIUM CHLORIDE 9 MG/ML
INJECTION, SOLUTION INTRAVENOUS CONTINUOUS
Status: DISCONTINUED | OUTPATIENT
Start: 2023-03-27 | End: 2023-03-30 | Stop reason: HOSPADM

## 2023-03-27 RX ADMIN — Medication 10 ML: at 09:47

## 2023-03-27 RX ADMIN — SODIUM HYPOCHLORITE: 2.5 SOLUTION TOPICAL at 09:43

## 2023-03-27 RX ADMIN — LEVOFLOXACIN 250 MG: 5 INJECTION, SOLUTION INTRAVENOUS at 20:03

## 2023-03-27 RX ADMIN — Medication: at 20:01

## 2023-03-27 RX ADMIN — PIPERACILLIN AND TAZOBACTAM 3375 MG: 3; .375 INJECTION, POWDER, LYOPHILIZED, FOR SOLUTION INTRAVENOUS at 05:35

## 2023-03-27 RX ADMIN — ASPIRIN 81 MG CHEWABLE TABLET 81 MG: 81 TABLET CHEWABLE at 09:44

## 2023-03-27 RX ADMIN — Medication 2 TABLET: at 20:02

## 2023-03-27 RX ADMIN — Medication: at 09:43

## 2023-03-27 RX ADMIN — Medication 2 TABLET: at 14:37

## 2023-03-27 RX ADMIN — SODIUM CHLORIDE: 9 INJECTION, SOLUTION INTRAVENOUS at 09:42

## 2023-03-27 RX ADMIN — GLIPIZIDE 2.5 MG: 5 TABLET ORAL at 09:45

## 2023-03-27 RX ADMIN — GLIPIZIDE 2.5 MG: 5 TABLET ORAL at 16:15

## 2023-03-27 RX ADMIN — Medication 2 TABLET: at 09:44

## 2023-03-27 RX ADMIN — SODIUM CHLORIDE: 9 INJECTION, SOLUTION INTRAVENOUS at 20:09

## 2023-03-27 ASSESSMENT — ENCOUNTER SYMPTOMS
ABDOMINAL DISTENTION: 0
COUGH: 0
EYE DISCHARGE: 0
SHORTNESS OF BREATH: 0
NAUSEA: 0
DIARRHEA: 0
VOMITING: 0

## 2023-03-27 NOTE — CONSULTS
guidewire was advanced into the vein with fluoroscopic guidance and a sheath was placed over the guidewire. A 5-Nepali dual-lumen PICC was advanced through the sheath, into the brachial vein, up the arm and into the central vasculature. It was positioned appropriately. The sheath was removed. The catheter was shown to aspirate and infuse properly. The flange of the catheter was affixed to the arm using a PICC securement device. A spot image of the chest showed the tip of the PICC line to lie in the right atrium. The patient tolerated the procedure well and without complications. CONCLUSION: SUCCESSFUL PICC PLACEMENT WITHOUT IMMEDIATE COMPLICATIONS. US DUP LOWER EXTREMITY LEFT ARTERIES    Result Date: 3/6/2023  EXAMINATION: ARTERIAL DUPLEX ULTRASOUND OF THE LEFT LOWER EXTREMITY  3/6/2023 10:16 am TECHNIQUE: Duplex ultrasound using B-mode/gray scaled imaging, Doppler spectral analysis and color flow Doppler was obtained of the lower left extremity. COMPARISON: None. HISTORY: ORDERING SYSTEM PROVIDED HISTORY: PVD TECHNOLOGIST PROVIDED HISTORY: Reason for exam:->PVD What reading provider will be dictating this exam?->CRC FINDINGS: Flow velocities were measured as follows: Com. Fem. 97 cm/sec, triphasic waveform SFA Prox. 91 cm/sec, triphasic waveform SFA Mid.     79 cm/sec, triphasic waveform SFA Dist.     96 cm/sec, triphasic waveform Pop.           70 cm/sec, biphasic waveform PTA            41 cm/sec, biphasic waveform Peron. not well seen KYLEE            144 cm/sec, biphasic waveform     Peroneal artery not well visualized and no appreciable furrow detected in this artery during the exam. Lower extremity major arterial structures otherwise patent with multiphasic waveforms. US RETROPERITONEAL LIMITED    Result Date: 3/27/2023  EXAMINATION: ULTRASOUND OF THE KIDNEYS 3/27/2023 9:03 am COMPARISON: None.  HISTORY: ORDERING SYSTEM PROVIDED HISTORY: DAVINA TECHNOLOGIST PROVIDED HISTORY: Reason for

## 2023-03-27 NOTE — CARE COORDINATION
Dr Elidia Almaraz did a virtual visit this afternoon. Patient has a new rash on left ankle and top of left foot. Patient also states he has not had an appetite. He has had a couple low grade temps. Creatninie 2.06 and HSCRP is 100.4. Dr Elidia Almaraz is concerned it is due to beta lactam sensitivity. Zosyn is to be discontinued, and she will be changing patient to an oral antibiotic.

## 2023-03-28 LAB
ANION GAP SERPL CALCULATED.3IONS-SCNC: 10 MEQ/L (ref 9–15)
BUN SERPL-MCNC: 35 MG/DL (ref 8–23)
C-REACTIVE PROTEIN, HIGH SENSITIVITY: 156.2 MG/L (ref 0–5)
CALCIUM SERPL-MCNC: 8.4 MG/DL (ref 8.5–9.9)
CHLORIDE SERPL-SCNC: 103 MEQ/L (ref 95–107)
CO2 SERPL-SCNC: 22 MEQ/L (ref 20–31)
CREAT SERPL-MCNC: 2.28 MG/DL (ref 0.7–1.2)
GLUCOSE BLD-MCNC: 108 MG/DL (ref 70–99)
GLUCOSE BLD-MCNC: 93 MG/DL (ref 70–99)
GLUCOSE SERPL-MCNC: 102 MG/DL (ref 70–99)
PERFORMED ON: ABNORMAL
PERFORMED ON: NORMAL
POTASSIUM SERPL-SCNC: 3.8 MEQ/L (ref 3.4–4.9)
PROCALCITONIN SERPL IA-MCNC: 0.24 NG/ML (ref 0–0.15)
SODIUM SERPL-SCNC: 135 MEQ/L (ref 135–144)

## 2023-03-28 PROCEDURE — 2580000003 HC RX 258: Performed by: INTERNAL MEDICINE

## 2023-03-28 PROCEDURE — 36415 COLL VENOUS BLD VENIPUNCTURE: CPT

## 2023-03-28 PROCEDURE — 87040 BLOOD CULTURE FOR BACTERIA: CPT

## 2023-03-28 PROCEDURE — 1200000002 HC SEMI PRIVATE SWING BED

## 2023-03-28 PROCEDURE — 6370000000 HC RX 637 (ALT 250 FOR IP): Performed by: INTERNAL MEDICINE

## 2023-03-28 PROCEDURE — 84145 PROCALCITONIN (PCT): CPT

## 2023-03-28 PROCEDURE — 86141 C-REACTIVE PROTEIN HS: CPT

## 2023-03-28 PROCEDURE — 80048 BASIC METABOLIC PNL TOTAL CA: CPT

## 2023-03-28 PROCEDURE — 6360000002 HC RX W HCPCS: Performed by: INTERNAL MEDICINE

## 2023-03-28 RX ORDER — LINEZOLID 600 MG/1
600 TABLET, FILM COATED ORAL EVERY 12 HOURS SCHEDULED
Status: DISCONTINUED | OUTPATIENT
Start: 2023-03-28 | End: 2023-03-29

## 2023-03-28 RX ADMIN — Medication: at 20:01

## 2023-03-28 RX ADMIN — LEVOFLOXACIN 250 MG: 5 INJECTION, SOLUTION INTRAVENOUS at 18:52

## 2023-03-28 RX ADMIN — Medication: at 08:13

## 2023-03-28 RX ADMIN — Medication 2 TABLET: at 20:00

## 2023-03-28 RX ADMIN — ASPIRIN 81 MG CHEWABLE TABLET 81 MG: 81 TABLET CHEWABLE at 08:12

## 2023-03-28 RX ADMIN — LINEZOLID 600 MG: 600 TABLET, FILM COATED ORAL at 20:00

## 2023-03-28 RX ADMIN — SODIUM HYPOCHLORITE: 2.5 SOLUTION TOPICAL at 08:14

## 2023-03-28 RX ADMIN — Medication 2 TABLET: at 14:31

## 2023-03-28 RX ADMIN — Medication 2 TABLET: at 08:12

## 2023-03-28 RX ADMIN — GLIPIZIDE 2.5 MG: 5 TABLET ORAL at 08:12

## 2023-03-28 RX ADMIN — SODIUM CHLORIDE: 9 INJECTION, SOLUTION INTRAVENOUS at 08:16

## 2023-03-28 RX ADMIN — Medication 10 ML: at 08:12

## 2023-03-28 RX ADMIN — SODIUM CHLORIDE: 9 INJECTION, SOLUTION INTRAVENOUS at 18:51

## 2023-03-28 ASSESSMENT — ENCOUNTER SYMPTOMS
VOMITING: 0
NAUSEA: 0
COUGH: 0
DIARRHEA: 0
SHORTNESS OF BREATH: 0

## 2023-03-28 ASSESSMENT — PAIN SCALES - GENERAL: PAINLEVEL_OUTOF10: 0

## 2023-03-29 VITALS
HEIGHT: 70 IN | WEIGHT: 229.5 LBS | SYSTOLIC BLOOD PRESSURE: 128 MMHG | TEMPERATURE: 99.7 F | RESPIRATION RATE: 18 BRPM | BODY MASS INDEX: 32.86 KG/M2 | OXYGEN SATURATION: 94 % | HEART RATE: 78 BPM | DIASTOLIC BLOOD PRESSURE: 81 MMHG

## 2023-03-29 LAB
ANION GAP SERPL CALCULATED.3IONS-SCNC: 12 MEQ/L (ref 9–15)
BUN SERPL-MCNC: 36 MG/DL (ref 8–23)
C-REACTIVE PROTEIN, HIGH SENSITIVITY: 201.3 MG/L (ref 0–5)
CALCIUM SERPL-MCNC: 7.7 MG/DL (ref 8.5–9.9)
CHLORIDE SERPL-SCNC: 107 MEQ/L (ref 95–107)
CO2 SERPL-SCNC: 19 MEQ/L (ref 20–31)
CREAT SERPL-MCNC: 2.28 MG/DL (ref 0.7–1.2)
GLUCOSE BLD-MCNC: 113 MG/DL (ref 70–99)
GLUCOSE BLD-MCNC: 93 MG/DL (ref 70–99)
GLUCOSE SERPL-MCNC: 97 MG/DL (ref 70–99)
PERFORMED ON: ABNORMAL
PERFORMED ON: NORMAL
POTASSIUM SERPL-SCNC: 3.7 MEQ/L (ref 3.4–4.9)
SODIUM SERPL-SCNC: 138 MEQ/L (ref 135–144)

## 2023-03-29 PROCEDURE — 6370000000 HC RX 637 (ALT 250 FOR IP): Performed by: INTERNAL MEDICINE

## 2023-03-29 PROCEDURE — 2580000003 HC RX 258: Performed by: INTERNAL MEDICINE

## 2023-03-29 PROCEDURE — 86141 C-REACTIVE PROTEIN HS: CPT

## 2023-03-29 PROCEDURE — 80048 BASIC METABOLIC PNL TOTAL CA: CPT

## 2023-03-29 RX ORDER — AMMONIUM LACTATE 12 G/100G
LOTION TOPICAL 2 TIMES DAILY
Status: CANCELLED | OUTPATIENT
Start: 2023-03-29

## 2023-03-29 RX ORDER — DEXTROSE MONOHYDRATE 100 MG/ML
INJECTION, SOLUTION INTRAVENOUS CONTINUOUS PRN
Status: CANCELLED | OUTPATIENT
Start: 2023-03-29

## 2023-03-29 RX ORDER — ONDANSETRON 2 MG/ML
4 INJECTION INTRAMUSCULAR; INTRAVENOUS EVERY 6 HOURS PRN
Status: CANCELLED | OUTPATIENT
Start: 2023-03-29

## 2023-03-29 RX ORDER — ASPIRIN 81 MG/1
81 TABLET, CHEWABLE ORAL DAILY
Status: CANCELLED | OUTPATIENT
Start: 2023-03-30

## 2023-03-29 RX ORDER — ACETAMINOPHEN 325 MG/1
650 TABLET ORAL EVERY 6 HOURS PRN
Status: CANCELLED | OUTPATIENT
Start: 2023-03-29

## 2023-03-29 RX ORDER — PROMETHAZINE HYDROCHLORIDE 12.5 MG/1
12.5 TABLET ORAL EVERY 6 HOURS PRN
Status: CANCELLED | OUTPATIENT
Start: 2023-03-29

## 2023-03-29 RX ORDER — GLIPIZIDE 5 MG/1
2.5 TABLET ORAL
Status: CANCELLED | OUTPATIENT
Start: 2023-03-30

## 2023-03-29 RX ORDER — POLYETHYLENE GLYCOL 3350 17 G/17G
17 POWDER, FOR SOLUTION ORAL DAILY PRN
Status: CANCELLED | OUTPATIENT
Start: 2023-03-29

## 2023-03-29 RX ORDER — LISINOPRIL 10 MG/1
10 TABLET ORAL DAILY
Status: CANCELLED | OUTPATIENT
Start: 2023-03-30

## 2023-03-29 RX ORDER — ENOXAPARIN SODIUM 100 MG/ML
30 INJECTION SUBCUTANEOUS 2 TIMES DAILY
Status: CANCELLED | OUTPATIENT
Start: 2023-03-29

## 2023-03-29 RX ORDER — SODIUM CHLORIDE 0.9 % (FLUSH) 0.9 %
5-40 SYRINGE (ML) INJECTION EVERY 12 HOURS SCHEDULED
Status: CANCELLED | OUTPATIENT
Start: 2023-03-29

## 2023-03-29 RX ORDER — SODIUM CHLORIDE 9 MG/ML
INJECTION, SOLUTION INTRAVENOUS PRN
Status: CANCELLED | OUTPATIENT
Start: 2023-03-29

## 2023-03-29 RX ORDER — ACETAMINOPHEN 650 MG/1
650 SUPPOSITORY RECTAL EVERY 6 HOURS PRN
Status: CANCELLED | OUTPATIENT
Start: 2023-03-29

## 2023-03-29 RX ORDER — SODIUM CHLORIDE 0.9 % (FLUSH) 0.9 %
10 SYRINGE (ML) INJECTION PRN
Status: CANCELLED | OUTPATIENT
Start: 2023-03-29

## 2023-03-29 RX ORDER — LEVOFLOXACIN 250 MG/1
250 TABLET ORAL DAILY
Status: CANCELLED | OUTPATIENT
Start: 2023-03-29 | End: 2023-04-05

## 2023-03-29 RX ORDER — L. ACIDOPHILUS/L.BULGARICUS 1MM CELL
2 TABLET ORAL 3 TIMES DAILY
Status: CANCELLED | OUTPATIENT
Start: 2023-03-29

## 2023-03-29 RX ORDER — SODIUM CHLORIDE 9 MG/ML
INJECTION, SOLUTION INTRAVENOUS CONTINUOUS
Status: CANCELLED | OUTPATIENT
Start: 2023-03-29

## 2023-03-29 RX ORDER — SODIUM CHLORIDE 0.9 % (FLUSH) 0.9 %
5-40 SYRINGE (ML) INJECTION PRN
Status: CANCELLED | OUTPATIENT
Start: 2023-03-29

## 2023-03-29 RX ORDER — INSULIN LISPRO 100 [IU]/ML
0-8 INJECTION, SOLUTION INTRAVENOUS; SUBCUTANEOUS 2 TIMES DAILY WITH MEALS
Status: CANCELLED | OUTPATIENT
Start: 2023-03-29

## 2023-03-29 RX ORDER — SODIUM CHLORIDE 9 MG/ML
25 INJECTION, SOLUTION INTRAVENOUS PRN
Status: CANCELLED | OUTPATIENT
Start: 2023-03-29

## 2023-03-29 RX ORDER — LEVOFLOXACIN 250 MG/1
250 TABLET ORAL DAILY
Status: DISCONTINUED | OUTPATIENT
Start: 2023-03-29 | End: 2023-03-30 | Stop reason: HOSPADM

## 2023-03-29 RX ADMIN — ASPIRIN 81 MG CHEWABLE TABLET 81 MG: 81 TABLET CHEWABLE at 08:28

## 2023-03-29 RX ADMIN — LINEZOLID 600 MG: 600 TABLET, FILM COATED ORAL at 08:28

## 2023-03-29 RX ADMIN — Medication 10 ML: at 21:07

## 2023-03-29 RX ADMIN — GLIPIZIDE 2.5 MG: 5 TABLET ORAL at 16:42

## 2023-03-29 RX ADMIN — SODIUM HYPOCHLORITE: 2.5 SOLUTION TOPICAL at 08:29

## 2023-03-29 RX ADMIN — Medication: at 08:28

## 2023-03-29 RX ADMIN — GLIPIZIDE 2.5 MG: 5 TABLET ORAL at 08:28

## 2023-03-29 RX ADMIN — Medication 2 TABLET: at 21:07

## 2023-03-29 RX ADMIN — LEVOFLOXACIN 250 MG: 250 TABLET, FILM COATED ORAL at 21:07

## 2023-03-29 RX ADMIN — Medication 2 TABLET: at 08:28

## 2023-03-29 RX ADMIN — Medication 2 TABLET: at 13:49

## 2023-03-29 RX ADMIN — SODIUM CHLORIDE: 9 INJECTION, SOLUTION INTRAVENOUS at 06:02

## 2023-03-29 RX ADMIN — Medication: at 21:08

## 2023-03-29 ASSESSMENT — ENCOUNTER SYMPTOMS
SHORTNESS OF BREATH: 0
DIARRHEA: 0
NAUSEA: 0
COUGH: 0
VOMITING: 0

## 2023-03-29 ASSESSMENT — PAIN SCALES - GENERAL: PAINLEVEL_OUTOF10: 0

## 2023-03-29 NOTE — CARE COORDINATION
Initiated transfer with [facility]. S/w Loyda Jimenez. Dr Venu Whitaker informed me Dr Dustin Hernandez will be accepting. 19:08  Cortez Lundborg from [facility] called with bed assignment. Room 469. Report to [phone].   [facility] to p/u at 10:00 pm.

## 2023-03-29 NOTE — CARE COORDINATION
Patient ambulated to [facility] for care conf. Patient is participated, but discouraged with elevated CRP and DAVINA. Dr Braulio Patiño was consulted today to assess left great toe. Additional needs will be discussed, after Dr Braulio Patiño see this patient. Temp 99.7 F this am.  ID following for IVATB, and Dr Tacos Jeffrey to do a V V tomorrow afternoon.

## 2023-03-30 ENCOUNTER — HOSPITAL ENCOUNTER (INPATIENT)
Age: 63
LOS: 2 days | Discharge: HOME | End: 2023-04-01
Attending: INTERNAL MEDICINE | Admitting: INTERNAL MEDICINE
Payer: COMMERCIAL

## 2023-03-30 ENCOUNTER — APPOINTMENT (OUTPATIENT)
Dept: GENERAL RADIOLOGY | Age: 63
End: 2023-03-30
Attending: INTERNAL MEDICINE
Payer: COMMERCIAL

## 2023-03-30 ENCOUNTER — TELEPHONE (OUTPATIENT)
Dept: INFECTIOUS DISEASES | Age: 63
End: 2023-03-30

## 2023-03-30 DIAGNOSIS — N14.2: Primary | ICD-10-CM

## 2023-03-30 PROBLEM — N17.9 AKI (ACUTE KIDNEY INJURY) (HCC): Status: ACTIVE | Noted: 2023-03-30

## 2023-03-30 PROBLEM — L97.522 DIABETIC ULCER OF TOE OF LEFT FOOT ASSOCIATED WITH DIABETES MELLITUS DUE TO UNDERLYING CONDITION, WITH FAT LAYER EXPOSED (HCC): Status: ACTIVE | Noted: 2023-03-30

## 2023-03-30 PROBLEM — M86.172 ACUTE OSTEOMYELITIS OF TOE OF LEFT FOOT (HCC): Status: ACTIVE | Noted: 2023-03-30

## 2023-03-30 PROBLEM — R79.82 ELEVATED C-REACTIVE PROTEIN (CRP): Status: ACTIVE | Noted: 2023-03-30

## 2023-03-30 PROBLEM — R79.82 ELEVATED C-REACTIVE PROTEIN: Status: ACTIVE | Noted: 2023-03-30

## 2023-03-30 PROBLEM — E08.621 DIABETIC ULCER OF TOE OF LEFT FOOT ASSOCIATED WITH DIABETES MELLITUS DUE TO UNDERLYING CONDITION, WITH FAT LAYER EXPOSED (HCC): Status: ACTIVE | Noted: 2023-03-30

## 2023-03-30 PROBLEM — R21 RASH OF FOOT: Status: ACTIVE | Noted: 2023-03-30

## 2023-03-30 LAB
ANION GAP SERPL CALCULATED.3IONS-SCNC: 11 MEQ/L (ref 9–15)
BACTERIA BLD CULT ORG #2: NORMAL
BASOPHILS # BLD: 0.1 K/UL (ref 0–0.2)
BASOPHILS NFR BLD: 0.8 %
BUN SERPL-MCNC: 37 MG/DL (ref 8–23)
CALCIUM SERPL-MCNC: 8.2 MG/DL (ref 8.5–9.9)
CHLORIDE SERPL-SCNC: 107 MEQ/L (ref 95–107)
CO2 SERPL-SCNC: 20 MEQ/L (ref 20–31)
CREAT SERPL-MCNC: 2.34 MG/DL (ref 0.7–1.2)
CREAT UR-MCNC: 93.5 MG/DL
EOSINOPHIL # BLD: 1.1 K/UL (ref 0–0.7)
EOSINOPHIL NFR BLD: 11.6 %
ERYTHROCYTE [DISTWIDTH] IN BLOOD BY AUTOMATED COUNT: 13.6 % (ref 11.5–14.5)
GLUCOSE BLD-MCNC: 103 MG/DL (ref 70–99)
GLUCOSE BLD-MCNC: 81 MG/DL (ref 70–99)
GLUCOSE BLD-MCNC: 95 MG/DL (ref 70–99)
GLUCOSE BLD-MCNC: 98 MG/DL (ref 70–99)
GLUCOSE BLD-MCNC: 98 MG/DL (ref 70–99)
GLUCOSE SERPL-MCNC: 86 MG/DL (ref 70–99)
HCT VFR BLD AUTO: 37 % (ref 42–52)
HGB BLD-MCNC: 12.6 G/DL (ref 14–18)
LYMPHOCYTES # BLD: 1.5 K/UL (ref 1–4.8)
LYMPHOCYTES NFR BLD: 16.1 %
MCH RBC QN AUTO: 30 PG (ref 27–31.3)
MCHC RBC AUTO-ENTMCNC: 34.1 % (ref 33–37)
MCV RBC AUTO: 87.9 FL (ref 79–92.2)
MONOCYTES # BLD: 1 K/UL (ref 0.2–0.8)
MONOCYTES NFR BLD: 10.7 %
NEUTROPHILS # BLD: 5.6 K/UL (ref 1.4–6.5)
NEUTS SEG NFR BLD: 60.8 %
PERFORMED ON: ABNORMAL
PERFORMED ON: NORMAL
PLATELET # BLD AUTO: 187 K/UL (ref 130–400)
POTASSIUM SERPL-SCNC: 4.2 MEQ/L (ref 3.4–4.9)
RBC # BLD AUTO: 4.21 M/UL (ref 4.7–6.1)
SODIUM SERPL-SCNC: 138 MEQ/L (ref 135–144)
WBC # BLD AUTO: 9.2 K/UL (ref 4.8–10.8)

## 2023-03-30 PROCEDURE — 80048 BASIC METABOLIC PNL TOTAL CA: CPT

## 2023-03-30 PROCEDURE — 2580000003 HC RX 258: Performed by: INTERNAL MEDICINE

## 2023-03-30 PROCEDURE — 6370000000 HC RX 637 (ALT 250 FOR IP): Performed by: INTERNAL MEDICINE

## 2023-03-30 PROCEDURE — 82570 ASSAY OF URINE CREATININE: CPT

## 2023-03-30 PROCEDURE — 99222 1ST HOSP IP/OBS MODERATE 55: CPT | Performed by: INTERNAL MEDICINE

## 2023-03-30 PROCEDURE — 36415 COLL VENOUS BLD VENIPUNCTURE: CPT

## 2023-03-30 PROCEDURE — 73660 X-RAY EXAM OF TOE(S): CPT

## 2023-03-30 PROCEDURE — 85025 COMPLETE CBC W/AUTO DIFF WBC: CPT

## 2023-03-30 PROCEDURE — 1210000000 HC MED SURG R&B

## 2023-03-30 PROCEDURE — 6370000000 HC RX 637 (ALT 250 FOR IP)

## 2023-03-30 RX ORDER — ACETAMINOPHEN 80 MG
TABLET,CHEWABLE ORAL ONCE
Status: DISCONTINUED | OUTPATIENT
Start: 2023-03-30 | End: 2023-04-02 | Stop reason: HOSPADM

## 2023-03-30 RX ORDER — AMMONIUM LACTATE 12 G/100G
LOTION TOPICAL 2 TIMES DAILY
Status: DISCONTINUED | OUTPATIENT
Start: 2023-03-30 | End: 2023-04-02 | Stop reason: HOSPADM

## 2023-03-30 RX ORDER — POLYETHYLENE GLYCOL 3350 17 G/17G
17 POWDER, FOR SOLUTION ORAL DAILY PRN
Status: DISCONTINUED | OUTPATIENT
Start: 2023-03-30 | End: 2023-04-02 | Stop reason: HOSPADM

## 2023-03-30 RX ORDER — INSULIN LISPRO 100 [IU]/ML
0-8 INJECTION, SOLUTION INTRAVENOUS; SUBCUTANEOUS 2 TIMES DAILY WITH MEALS
Status: DISCONTINUED | OUTPATIENT
Start: 2023-03-30 | End: 2023-04-02 | Stop reason: HOSPADM

## 2023-03-30 RX ORDER — ASPIRIN 81 MG/1
81 TABLET, CHEWABLE ORAL DAILY
Status: DISCONTINUED | OUTPATIENT
Start: 2023-03-30 | End: 2023-04-02 | Stop reason: HOSPADM

## 2023-03-30 RX ORDER — SODIUM CHLORIDE 9 MG/ML
INJECTION, SOLUTION INTRAVENOUS CONTINUOUS
Status: DISCONTINUED | OUTPATIENT
Start: 2023-03-30 | End: 2023-03-31

## 2023-03-30 RX ORDER — ACETAMINOPHEN 650 MG/1
650 SUPPOSITORY RECTAL EVERY 6 HOURS PRN
Status: DISCONTINUED | OUTPATIENT
Start: 2023-03-30 | End: 2023-04-02 | Stop reason: HOSPADM

## 2023-03-30 RX ORDER — LEVOFLOXACIN 250 MG/1
250 TABLET ORAL DAILY
Status: DISCONTINUED | OUTPATIENT
Start: 2023-03-30 | End: 2023-04-02 | Stop reason: HOSPADM

## 2023-03-30 RX ORDER — SODIUM CHLORIDE 9 MG/ML
INJECTION, SOLUTION INTRAVENOUS PRN
Status: DISCONTINUED | OUTPATIENT
Start: 2023-03-30 | End: 2023-04-02 | Stop reason: HOSPADM

## 2023-03-30 RX ORDER — SODIUM CHLORIDE 0.9 % (FLUSH) 0.9 %
5-40 SYRINGE (ML) INJECTION EVERY 12 HOURS SCHEDULED
Status: DISCONTINUED | OUTPATIENT
Start: 2023-03-30 | End: 2023-04-02 | Stop reason: HOSPADM

## 2023-03-30 RX ORDER — ENOXAPARIN SODIUM 100 MG/ML
30 INJECTION SUBCUTANEOUS 2 TIMES DAILY
Status: DISCONTINUED | OUTPATIENT
Start: 2023-03-30 | End: 2023-04-02 | Stop reason: HOSPADM

## 2023-03-30 RX ORDER — ONDANSETRON 2 MG/ML
4 INJECTION INTRAMUSCULAR; INTRAVENOUS EVERY 6 HOURS PRN
Status: DISCONTINUED | OUTPATIENT
Start: 2023-03-30 | End: 2023-04-02 | Stop reason: HOSPADM

## 2023-03-30 RX ORDER — L. ACIDOPHILUS/L.BULGARICUS 1MM CELL
1 TABLET ORAL DAILY
Status: DISCONTINUED | OUTPATIENT
Start: 2023-03-31 | End: 2023-04-02 | Stop reason: HOSPADM

## 2023-03-30 RX ORDER — SODIUM CHLORIDE 0.9 % (FLUSH) 0.9 %
10 SYRINGE (ML) INJECTION PRN
Status: DISCONTINUED | OUTPATIENT
Start: 2023-03-30 | End: 2023-04-02 | Stop reason: HOSPADM

## 2023-03-30 RX ORDER — SODIUM CHLORIDE 0.9 % (FLUSH) 0.9 %
5-40 SYRINGE (ML) INJECTION PRN
Status: DISCONTINUED | OUTPATIENT
Start: 2023-03-30 | End: 2023-04-02 | Stop reason: HOSPADM

## 2023-03-30 RX ORDER — LEVOFLOXACIN 5 MG/ML
500 INJECTION, SOLUTION INTRAVENOUS
Status: ON HOLD | COMMUNITY
End: 2023-04-01 | Stop reason: HOSPADM

## 2023-03-30 RX ORDER — PROMETHAZINE HYDROCHLORIDE 12.5 MG/1
12.5 TABLET ORAL EVERY 6 HOURS PRN
Status: DISCONTINUED | OUTPATIENT
Start: 2023-03-30 | End: 2023-04-02 | Stop reason: HOSPADM

## 2023-03-30 RX ORDER — ACETAMINOPHEN 325 MG/1
650 TABLET ORAL EVERY 6 HOURS PRN
Status: DISCONTINUED | OUTPATIENT
Start: 2023-03-30 | End: 2023-04-02 | Stop reason: HOSPADM

## 2023-03-30 RX ORDER — DEXTROSE MONOHYDRATE 100 MG/ML
INJECTION, SOLUTION INTRAVENOUS CONTINUOUS PRN
Status: DISCONTINUED | OUTPATIENT
Start: 2023-03-30 | End: 2023-04-02 | Stop reason: HOSPADM

## 2023-03-30 RX ORDER — L. ACIDOPHILUS/L.BULGARICUS 1MM CELL
2 TABLET ORAL 3 TIMES DAILY
Status: DISCONTINUED | OUTPATIENT
Start: 2023-03-30 | End: 2023-03-30

## 2023-03-30 RX ORDER — GLIPIZIDE 5 MG/1
2.5 TABLET ORAL
Status: DISCONTINUED | OUTPATIENT
Start: 2023-03-30 | End: 2023-04-02 | Stop reason: HOSPADM

## 2023-03-30 RX ORDER — SODIUM CHLORIDE 9 MG/ML
25 INJECTION, SOLUTION INTRAVENOUS PRN
Status: DISCONTINUED | OUTPATIENT
Start: 2023-03-30 | End: 2023-04-02 | Stop reason: HOSPADM

## 2023-03-30 RX ORDER — TRIAMCINOLONE ACETONIDE 1 MG/G
CREAM TOPICAL 2 TIMES DAILY
Status: DISCONTINUED | OUTPATIENT
Start: 2023-03-30 | End: 2023-04-02 | Stop reason: HOSPADM

## 2023-03-30 RX ADMIN — SODIUM CHLORIDE: 9 INJECTION, SOLUTION INTRAVENOUS at 11:29

## 2023-03-30 RX ADMIN — TRIAMCINOLONE ACETONIDE: 1 CREAM TOPICAL at 20:10

## 2023-03-30 RX ADMIN — HYOSCYAMINE SULFATE: 16 SOLUTION at 11:03

## 2023-03-30 RX ADMIN — Medication 10 ML: at 20:08

## 2023-03-30 RX ADMIN — LACTOBACILLUS TAB 2 TABLET: TAB at 09:38

## 2023-03-30 RX ADMIN — ASPIRIN 81 MG 81 MG: 81 TABLET ORAL at 09:38

## 2023-03-30 RX ADMIN — SODIUM CHLORIDE: 9 INJECTION, SOLUTION INTRAVENOUS at 02:07

## 2023-03-30 RX ADMIN — LEVOFLOXACIN 250 MG: 250 TABLET, FILM COATED ORAL at 18:51

## 2023-03-30 RX ADMIN — Medication: at 10:55

## 2023-03-30 RX ADMIN — Medication: at 20:10

## 2023-03-30 RX ADMIN — GLIPIZIDE 2.5 MG: 5 TABLET ORAL at 16:37

## 2023-03-30 RX ADMIN — SODIUM CHLORIDE: 9 INJECTION, SOLUTION INTRAVENOUS at 20:11

## 2023-03-30 RX ADMIN — GLIPIZIDE 2.5 MG: 5 TABLET ORAL at 09:39

## 2023-03-30 ASSESSMENT — ENCOUNTER SYMPTOMS
SORE THROAT: 0
SHORTNESS OF BREATH: 0
ABDOMINAL DISTENTION: 0
NAUSEA: 0
BACK PAIN: 0
RHINORRHEA: 0
COUGH: 0
ABDOMINAL PAIN: 0
COLOR CHANGE: 1
VOMITING: 0
PHOTOPHOBIA: 0
DIARRHEA: 0
CHEST TIGHTNESS: 0
TROUBLE SWALLOWING: 0
CONSTIPATION: 0

## 2023-03-30 ASSESSMENT — PAIN SCALES - GENERAL
PAINLEVEL_OUTOF10: 0

## 2023-03-30 NOTE — CONSULTS
Spiritual Care Services     Summary of Visit:  Pt tired this morning from his late night transfer from [place], some anxiety about new developments. Gratitude that the wound on his foot is healing well. Pt is a Messianic Gnosticist. Pt prays in the name of Jag and [place]. He is connected to an online afia community, but no local community. Pt's afia journey is very important to him. Pt has requested an unleavened diet as we approach [facility], April 5 beginning at [address] until April 13 after sundown and no pork or shellfish anytime. Communication with nutrition services to put in this request.     Prayer provided for pt this morning. Encounter Summary  Encounter Overview/Reason : Initial Encounter  Service Provided For[de-identified] Patient  Referral/Consult From[de-identified] Physician  Support System: Family members, Protestant/afia community  Complexity of Encounter: Moderate  Begin Time: 0805  End Time : 0830  Total Time Calculated: 25 min  Encounter   Type: Initial Screen/Assessment     Spiritual/Emotional needs  Type: Spiritual Support        Spiritual Assessment/Intervention/Outcomes:    Assessment: Anxious    Intervention: Active listening, Discussed illness injury and its impact, Prayer (assurance of)/[place]    Outcome: Comfort      Care Plan:    Plan and Referrals  Plan/Referrals: Continue Support (comment)    Spiritual Care Services   Electronically signed by Dottie Cantu [facility] on 3/30/2023 at 8:28 AM.    To reach a  for emotional and spiritual support, place an [facility] consult request.   If a  is needed immediately, dial 0 and ask to page the on-call .

## 2023-03-30 NOTE — PROGRESS NOTES
Agree with dr Kari Machado consult and input, appreciated. Agree with dr Constanza Spaulding consult and input, appreciated. Case discussed with Dr. Shante Li who had contacted me to give an update regarding this patient when the patient was at [facility]. 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 Elevated CRP, increasing creatinine, urine eosinophils, rash, etc. are likely all from the same, less likely there is another new concurrent disease process. will continue to monitor creatinine. when downtrending or flat trend, can likely be dc'd to [facility] if cleared from nephrology perspective. Case management asked to initiate precert with anticipation that creatinine may have plateaued.       Lev Rapp MD

## 2023-03-30 NOTE — PLAN OF CARE
Nutrition Problem #1: Inadequate oral intake  Intervention: Food and/or Nutrient Delivery: Continue Current Diet, Discontinue Oral Nutrition Supplement  Nutritional

## 2023-03-30 NOTE — TELEPHONE ENCOUNTER
Patient transferred to [facility] [place] for evaluation of elevating cr and crp with new rash L foot thought to be secondary to interstitial nephritis vs vasculitis. Discussed case with ID, Primary at [facility] and Primary at [facility]. Discussed plan with  at [facility] [place]. Monitoring Cr  Rash also under evaluation.      Andreia Fajardo, DO

## 2023-03-30 NOTE — CARE COORDINATION
Case Management Assessment  Initial Evaluation    Date/Time of Evaluation: 3/30/2023 1:39 PM  Assessment Completed by: PRISCILLA Sandoval, KIARA    If patient is discharged prior to next notation, then this note serves as note for discharge by case management. Patient Name: Honey Barroso                   YOB: 1960  Diagnosis: DAVINA (acute kidney injury) [facility]) [N17.9]                   Date / Time: 3/30/2023 12:01 AM    Patient Admission Status: Inpatient   Readmission Risk (Low < 19, Mod (19-27), High > 27): Readmission Risk Score: 16.9    Current PCP: Kenan Granado MD  PCP verified by CM? Yes    Chart Reviewed: Yes      History Provided by: Patient  Patient Orientation: Alert and Oriented    Patient Cognition: Alert    Hospitalization in the last 30 days (Readmission):  No    If yes, Readmission Assessment in CM Navigator will be completed. Advance Directives:      Code Status: Full Code   Patient's Primary Decision Maker is:        Discharge Planning:    Patient lives with: Alone Type of Home: House  Primary Care Giver: Self  Patient Support Systems include: Family Members   Current Financial resources:    Current community resources:    Current services prior to admission: None            Current DME:              Type of Home Care services:  None    ADLS  Prior functional level: Independent in ADLs/IADLs  Current functional level: Independent in ADLs/IADLs    PT AM-PAC:   /24  OT AM-PAC:   /24    Family can provide assistance at DC: No  Would you like Case Management to discuss the discharge plan with any other family members/significant others, and if so, who?  No  Plans to Return to Present Housing: Yes  Other Identified Issues/Barriers to RETURNING to current housing:  Medical Complications     Potential Assistance needed at discharge: N/A            Potential DME:    Patient expects to discharge to: [address] [place] [address] for transportation at discharge:      Financial    Payor: Jonas Mederos / Plan: CIG[employer] / Product Type: *No Product type* /     Does insurance require precert for SNF: Yes    Potential assistance Purchasing Medications:    Meds-to-Beds request: Yes      [id number] [place] [facility] [address] [facility] Patti Toledo [phone]  [address] [place] [address]  Phone: [phone] Fax: [phone]      Notes:    Factors facilitating achievement of predicted outcomes: Family support    Barriers to discharge: Medical complications    Additional Case Management Notes: Patient plans to return home upon discharge. Patient lives alone in [address] [facility]. Patient niece is supportive but is not able to assist with care needs. Patient is accepting of [facility] if IV/ATB therapy is required upon discharge. The Plan for Transition of Care is related to the following treatment goals of DAVINA (acute kidney injury) ([facility] [address] [Q47.4]    IF APPLICABLE: The Patient and/or patient representative Jimmy Leung and his family were provided with a choice of provider and agrees with the discharge plan. Freedom of choice list with basic dialogue that supports the patient's individualized plan of care/goals and shares the quality data associated with the providers was provided to:     Patient Representative Name:       The Patient and/or Patient Representative Agree with the Discharge Plan?       Lynette Tripp MSW, LSW  Case Management Department  Ph:  Fax:

## 2023-03-30 NOTE — PROGRESS NOTES
Patient resting in bed. No complaints of pain or discomfort. No further needs. Call light in reach. 1100 Patient toe dressed according to orders. Tolerated without any difficulties.  Patient down for xray    1335 Patient arrived back to floor from xray    1700 Patient completed 2 full laps of the floor with no difficulties.

## 2023-03-30 NOTE — DISCHARGE SUMMARY
Discharge Medication List as of 3/29/2023 11:15 PM        Discharge Medication List as of 3/29/2023 11:15 PM        Discharge Medication List as of 3/29/2023 11:15 PM    CONTINUE these medications which have NOT CHANGED    piperacillin-tazobactam (ZOSYN) infusion  Infuse 3.375 g intravenously in the morning and 3.375 g at noon and 3.375 g in the evening. Compound per protocol. ., Disp-304 g, R-1  Print          Discharge Medication List as of 3/29/2023 11:15 PM        Time Spent on Discharge:  minutes were spent in patient examination, evaluation, counseling as well as medication reconciliation, prescriptions for required medications, discharge plan, and follow up.     Electronically signed by Phyllis Reyes MD on 3/30/23 at 8:44 AM EDT   Overtime on dc summary was 45 min

## 2023-03-30 NOTE — CONSULTS
levoFLOXacin  250 mg Oral Daily    pill splitter   Does not apply Once    [START ON 3/31/2023] lactobacillus acidophilus  1 tablet Oral Daily       Allergies:  Zosyn [piperacillin sod-tazobactam so]    Social History     Socioeconomic History    Marital status: Single     Spouse name: Not on file    Number of children: Not on file    Years of education: Not on file    Highest education level: Not on file   Occupational History    Not on file   Tobacco Use    Smoking status: Never    Smokeless tobacco: Never   Substance and Sexual Activity    Alcohol use: Not Currently     Alcohol/week: 1.0 standard drink     Types: 1 Glasses of wine per week    Drug use: Not Currently    Sexual activity: Not on file   Other Topics Concern    Not on file   Social History Narrative    Not on file     Social Determinants of Health     Financial Resource Strain: Not on file   Food Insecurity: Not on file   Transportation Needs: Not on file   Physical Activity: Not on file   Stress: Not on file   Social Connections: Not on file   Intimate Partner Violence: Not on file   Housing Stability: Not on file         Family History:   No family history on file. Review of Systems  14 system review is negative other than HPI    Physical Exam  Vitals:    03/30/23 0030 03/30/23 0046 03/30/23 0600 03/30/23 0724   BP:  123/76  134/79   Pulse:  80  77   Resp:  18  18   Temp:  97.9 °F (36.6 °C)  99.5 °F (37.5 °C)   TempSrc:  Oral  Oral   SpO2:  98%  97%   Weight:   236 lb (107 kg)    Height: 5' 10\" (1.778 m)        General Appearance: alert and oriented to person, place and time, well-developed and well-nourished, in no acute distress, on room air  Head: normocephalic and atraumatic  Eyes: extraocular eye movements intact, conjunctivae normal, anicteric sclerae  ENT: oropharynx clear and moist with normal mucous membranes.  No thrush  Lungs: normal respiratory effort, Clear Lungs, no rhonchi, no crackles, no wheezes  Heart:RRR, nl S1/S2, no Resistant >=64 mcg/mL BACTERIAL SUSCEPTIBILITY PANEL BY ELTON     cefTRIAXone Sensitive <=0.25 mcg/mL BACTERIAL SUSCEPTIBILITY PANEL BY ELTON     gentamicin Sensitive <=1 mcg/mL BACTERIAL SUSCEPTIBILITY PANEL BY ELTON     levofloxacin Sensitive 1 mcg/mL BACTERIAL SUSCEPTIBILITY PANEL BY ELTON     piperacillin-tazobactam Sensitive <=4 mcg/mL BACTERIAL SUSCEPTIBILITY PANEL BY ELTON     trimethoprim-sulfamethoxazole Sensitive <=20 mcg/mL BACTERIAL SUSCEPTIBILITY PANEL                 Imaging:   Renal ultrasound done on March 27 was negative  Left foot MRI done on March 7  Impression   Abnormal marrow signal alteration and enhancement in the great toe proximal   and distal phalanges suspicious for osteomyelitis given the adjacent soft   tissue ulceration. Findings most pronounced in the distal phalanx. IMPRESSION:    Left great toe acute osteomyelitis  Polymicrobial bacterial infection including Enterococcus faecalis and providencia  Drug rash and interstitial nephritis secondary to Zosyn. Most probable cause of fever and elevated CRP since patient had normal CRP 10 days ago and has progressive wound healing  Left great toe diabetic foot ulcer associated with diabetes mellitus type 2, improving    Patient Active Problem List   Diagnosis    Cellulitis    Osteomyelitis of great toe of left foot (HCC)    Osteomyelitis (HCC)    DAVINA (acute kidney injury) ([facility] [address]       PLAN:  Add Zosyn to allergy list as discussed with the patient  Continue IV Levaquin for now  Sed rate in a.m.   Left great toe x-ray  Follow-up blood cultures collected 2 days ago  Follow-up patient is clinically  Repeat CRP in 2-3 days  Local wound care and follow-up with podiatry    Discussed with patient    Taz Holder MD

## 2023-03-30 NOTE — PROGRESS NOTES
Pt arrived at [facility], ambulated to bed independently. Med reconciliation and admission paperwork completed. Dr. Anselmo Sam notified.

## 2023-03-30 NOTE — H&P
chest tightness and shortness of breath. Cardiovascular:  Negative for chest pain, palpitations and leg swelling. Gastrointestinal:  Negative for abdominal distention, abdominal pain, constipation, diarrhea, nausea and vomiting. Genitourinary:  Negative for difficulty urinating, flank pain and hematuria. Musculoskeletal:  Negative for back pain and gait problem. Skin:  Positive for color change, rash and wound. Neurological:  Negative for dizziness, syncope, speech difficulty, weakness, light-headedness, numbness and headaches. Psychiatric/Behavioral:  Negative for behavioral problems and confusion. OBJECTIVE  PHYSICAL EXAM: There were no vitals taken for this visit. Physical Exam  Vitals and nursing note reviewed. Constitutional:       General: He is awake. He is not in acute distress. Appearance: Normal appearance. He is well-developed and normal weight. He is not ill-appearing, toxic-appearing or diaphoretic. HENT:      Head: Normocephalic and atraumatic. Nose: Nose normal. No congestion or rhinorrhea. Mouth/Throat:      Lips: Pink. Mouth: Mucous membranes are moist.      Tongue: Tongue does not deviate from midline. Pharynx: Oropharynx is clear. No oropharyngeal exudate or posterior oropharyngeal erythema. Eyes:      General: Lids are normal. No visual field deficit or scleral icterus. Extraocular Movements: Extraocular movements intact. Right eye: No nystagmus. Left eye: No nystagmus. Conjunctiva/sclera: Conjunctivae normal.   Neck:      Thyroid: No thyromegaly. Vascular: No JVD. Trachea: Trachea and phonation normal.   Cardiovascular:      Rate and Rhythm: Normal rate and regular rhythm. Pulses:           Radial pulses are 2+ on the right side and 2+ on the left side. Dorsalis pedis pulses are 2+ on the right side and detected w/ Doppler on the left side.         Posterior tibial pulses are 2+ on the right side and detected w/ Doppler on the left side. Heart sounds: Normal heart sounds, S1 normal and S2 normal. No murmur heard. Pulmonary:      Effort: Pulmonary effort is normal. No respiratory distress. Breath sounds: Normal breath sounds and air entry. No stridor or decreased air movement. No decreased breath sounds, wheezing, rhonchi or rales. Chest:      Chest wall: No tenderness. Abdominal:      General: Abdomen is flat. Bowel sounds are normal. There is no distension. Palpations: Abdomen is soft. Tenderness: There is no abdominal tenderness. There is no guarding. Musculoskeletal:         General: No swelling, tenderness, deformity or signs of injury. Normal range of motion. Cervical back: Normal range of motion and neck supple. No rigidity or tenderness. Right lower le+ Pitting Edema present. Left lower le+ Pitting Edema present. Feet:    Feet:      Right foot:      Skin integrity: Callus and dry skin present. Toenail Condition: Right toenails are abnormally thick and long. Left foot:      Skin integrity: Erythema, warmth, callus and dry skin present. Toenail Condition: Left toenails are abnormally thick and long. Skin:     General: Skin is warm and dry. Capillary Refill: Capillary refill takes less than 2 seconds. Coloration: Skin is not jaundiced or pale. Findings: No bruising, erythema, lesion or rash. Nails: There is no clubbing. Neurological:      General: No focal deficit present. Mental Status: He is alert and oriented to person, place, and time. Mental status is at baseline. Sensory: Sensation is intact. Motor: Motor function is intact. No weakness. Psychiatric:         Attention and Perception: Attention and perception normal.         Mood and Affect: Mood and affect normal.         Speech: Speech normal.         Behavior: Behavior normal. Behavior is cooperative. Thought Content:  Thought

## 2023-03-30 NOTE — PROGRESS NOTES
Comprehensive Nutrition Assessment    Type and Reason for Visit:  Initial, Positive Nutrition Screen (+ malnutrition screen, [employer] food prefernces)    Nutrition Recommendations/Plan:   Continue Carb Control 5 diet as tolerated  D/c diabetic oral supplement per pt request  Counseled on importance of adequate energy & protein intake, for recovery and disease management, with emphasis on nutrient rich food choices   Follow up prior to [facility] to reevaluate dietary requests     Malnutrition Assessment:  Malnutrition Status:  No malnutrition (03/30/23 1450)        Nutrition Assessment:    Nutritional status appears adequate at this time, based on availalbe information, pt has been ordered diabetic oral supplement while at [facility], reports taking < 25%, discussed high protein and nutrient dense menu options, requriements for [facility]over discussed ( No pork, no shellfish, no leavened products or yeast as of 4/5/23 at [facility]), pt may have a family member who can bring in special items for him    Nutrition Related Findings:    \" Admit to [facility] on 3/10/2023 for new onset diabetes, new onset hypertension, cellulitis of the left leg and osteomyelitis of the left great toe.   transferred to skilled nursing facility for wound care and antibiotics. Patient was on IV Zosyn for a few weeks when he developed a rash on his foot and his creatinine increased. Renal ultrasound was done and revealed no signs of infection \" Received DM ed while at [facility], glucose currently controlled ( < 120), BUN = 37, creat = 2.34, nerphology following, pt admits mild decrease in appetite due to GI upset from I antibiotics, 1+ LLE edema per nursing Wound Type:  (L great toe ulcer)       Current Nutrition Intake & Therapies:    Average Meal Intake: 26-50%, 51-75% (per pt)  Average Supplements Intake: 1-25%  ADULT DIET;  Regular; 5 carb choices (75 gm/meal)  ADULT ORAL NUTRITION SUPPLEMENT; Breakfast; Diabetic Oral Supplement    Anthropometric Measures:  Height: 5' 10\" (177.8 cm)  Ideal Body Weight (IBW): 166 lbs (75 kg)    Admission Body Weight: 234 lb (106.1 kg) (3/4/23 original admit)  Current Body Weight: 236 lb (107 kg) (* edema),   > 100% IBW. Current BMI (kg/m2): 33.9  Usual Body Weight: 240 lb (108.9 kg) (per pt)  % Weight Change (Calculated): -1.7  Weight Adjustment For: No Adjustment                 BMI Categories: Obese Class 1 (BMI 30.0-34. 9)    Estimated Daily Nutrient Needs:  Energy Requirements Based On: Kcal/kg     Energy (kcal/day): 1184-3759 kcals @ 15-18 kcal/kg  Weight Used for Protein Requirements: Ideal  Protein (g/day): ~ 91 g protein @ 1.2 g/kg  Method Used for Fluid Requirements: 1 ml/kcal  Fluid (ml/day): ~2000    Nutrition Diagnosis:   Inadequate oral intake related to early satiety as evidenced by poor intake prior to admission    Nutrition Interventions:   Food and/or Nutrient Delivery: Continue Current Diet, Discontinue Oral Nutrition Supplement  Nutrition Education/Counseling: No recommendation at this time  Coordination of Nutrition Care: Continue to monitor while inpatient       Goals:     Goals: PO intake 75% or greater, by next RD assessment       Nutrition Monitoring and Evaluation:   Behavioral-Environmental Outcomes: None Identified  Food/Nutrient Intake Outcomes: Diet Advancement/Tolerance, Food and Nutrient Intake  Physical Signs/Symptoms Outcomes: Biochemical Data, Meal Time Behavior, Skin, Weight    Discharge Planning:    No discharge needs at this time     Jessica Dixon, RD, LD

## 2023-03-30 NOTE — CONSULTS
pyuria  NEURO:  No new balance problems, peripheral weakness, paresthesias, numbness  MSK: no pain  PSY: No concerns regarding depression, anxiety  INTEGUMENTARY: see HPI    OBJECTIVE:  /79   Pulse 79   Temp 99.5 °F (37.5 °C) (Oral)   Resp 18   Ht 5' 10\" (1.778 m)   Wt 236 lb (107 kg)   SpO2 97%   BMI 33.86 kg/m²     Patient is alert and oriented to person, place, and time    Vascular:   - Palpable Dorsalis Pedis and Palpable Posterior Tibial Pulses B/L   - Capillary Fill time < 5 seconds to B/L digits  - Skin temperature warm to warm tibial tuberosity to the digits B/L  - Hair growth absent to digits  - no edema    Neurological:   - Sensation to light touch intact B/L  - Protective sensation via monofilament testing intact B/L    Musculoskeletal/Orthopaedic:   - 5/5 muscle strength Dorsiflexion, Plantarflexion, Inversion, Eversion B/L  - No pain on palpation. Dermatological:   Skin rash to the medial distal leg and dorsal foot. No connection between two areas. No open site in rash area. Nothing raised or draining. Left hallux pre-ulcerative with surrounding maceration. No probing or undermining. No active drainage. No active signs of infection.      LABS:   Lab Results   Component Value Date    WBC 9.2 03/30/2023    HGB 12.6 (L) 03/30/2023    HCT 37.0 (L) 03/30/2023    MCV 87.9 03/30/2023     03/30/2023     Lab Results   Component Value Date/Time     03/30/2023 06:00 AM    K 4.2 03/30/2023 06:00 AM    K 3.8 03/06/2023 06:03 AM     03/30/2023 06:00 AM    CO2 20 03/30/2023 06:00 AM    BUN 37 03/30/2023 06:00 AM    CREATININE 2.34 03/30/2023 06:00 AM    GLUCOSE 86 03/30/2023 06:00 AM    CALCIUM 8.2 03/30/2023 06:00 AM      Lab Results   Component Value Date    LABALBU 3.3 (L) 03/27/2023     Lab Results   Component Value Date    SEDRATE 22 (H) 03/05/2023     Lab Results   Component Value Date    CRP 9.1 (H) 03/05/2023     Lab Results   Component Value Date    LABA1C 11.8 (H) 03/05/2023       MICROBIOLOGY:   N/a    IMAGING:   Xr left toe 3/30/23:  Impression   Diffuse soft tissue swelling of the left 1st toe. Could consider repeat MRI   to further evaluate.            Vascular studies:  N/a    Katey Bauer DPM, PGY-2  Podiatric Surgery Resident  Podiatry On Call Pager: [phone]  March 30, 2023  6:33 PM

## 2023-03-30 NOTE — PROGRESS NOTES
BG @ 1600 is 92. Pt is supposed to get 5mg glipizide and 500 mg metformin. No parameters on med. Ines medrano served for clarification. Per dr Karine Izaguirre , hold both medications. Meds held. . See mar.
Chart reviewed  Cr stable at 2.28.  not better but not worse today either  Urine studies noted. UA still pending. Urine eos +. Urine na 36  Renal u/s ok    58 yo man with no previous medical history. Admitted with osteomyelitis. Hba1c 11.8 and hypertensive. Placed on lisinopril and dm meds. Has had normal kidney function entire hopsitalization last few weeks. Now with DAVINA. Possible ATN from abx. Possible hemodynamic. Renal u/s ok. Urine + eos. AIN vs ATN.        Plan/  1- IVF and hold lisinopril and metformin  2- Adjust zosyn to 2.25 q 8
Chart reviewed. Multidicplinary team met with patient in care conference to discuss patient's care and DC planning. Patient reports feeling confident in the care currently receiving at [facility]. Patient reports feeling better and reports plan to return home upon completing IV antibiotics. SS to continue to follow and monitor patient's progress for DC needs.
Chart reviewed. Multidisciplinary team met with patient in care conference. DC plan remains for patient to return home and follow up with Dr. Cailin Simon for dressing changed upon completing IV antibiotics. ID is following patient at [facility]. SS to continue to follow while patient is at [facility].
Chart reviewed. Multidisciplinary team met with patient in care conference. Discussed patient's care and DC planning. Patient reports feeling discouraged with recent lab results and concern infection is getting worse. Infectious disease, podiatry -Dr. Sundar Espinosa, and nephrology -Dr. Sheila Ruiz along with hospitalist are following patient at [facility]. SS provided support through active listening, validation and encouragement.   SS to continue to follow while patient is at [facility]
Comprehensive Nutrition Assessment    Type and Reason for Visit:  Initial, Wound    Nutrition Recommendations/Plan:   Continue current diet as ordered  PO >75% Meals  Continue diet education reinforcement as needed     Malnutrition Assessment:  Malnutrition Status: Moderate malnutrition (03/10/23 0801)        Nutrition Assessment:    No s/s of malnutrition. New DM diagnosis. Patient diabetes diet education provided on 3/6 and 3/8. Will continue to follow up and answer diet questions while in house. Diet information and RD contact information provided. Pt verbalizes understanding of diet. Pt reports good appetite, adequate PO Intake. Nutrition Related Findings:    Admitted related to cellulitis and new DM diagnosis. Admit weight 234#, BMI 33.7- obese. Labs reviewed 3/5 hemoglobin A1c 11.8. Accuchecks reviewed- remain high, wound/infection may be contributing to elevated blood glucose. Labs from 3/6 reviewed (CMP). DM diet, 5 carbs per meal ordered. Skin- wound to left great toe noted. Meds reviewed. Pt reports fair/good appetite- feels medication is affecting appetite somewhat but overall eating well. Wound Type: Diabetic Ulcer Wound Type: Diabetic Ulcer       Current Nutrition Intake & Therapies:    Average Meal Intake: %  Average Supplements Intake: None Ordered  ADULT DIET; Regular; 5 carb choices (75 gm/meal)    Anthropometric Measures:  Height: 5' 10\" (177.8 cm)  Ideal Body Weight (IBW): 166 lbs (75 kg)    Admission Body Weight: 234 lb (106.1 kg)  Current Body Weight: 234 lb (106.1 kg) (3/5/23), 141 % IBW.  Weight Source: Bed Scale  Current BMI (kg/m2): 33.6  Usual Body Weight: 235 lb (106.6 kg) (pt reported)  % Weight Change (Calculated): -0.4    Estimated Daily Nutrient Needs:  Energy Requirements Based On: Kcal/kg  Weight Used for Energy Requirements: Current  Energy (kcal/day): 7275-9013 (17-19kcal/kg)  Weight Used for Protein Requirements: Ideal  Protein (g/day): ~1135gm  Method Used for Fluid
Comprehensive Nutrition Assessment    Type and Reason for Visit:  Reassess    Nutrition Recommendations/Plan:   Continue with Carb Control 5 diet  Current weight ordered for further assessment     Malnutrition Assessment:  Malnutrition Status:  No malnutrition (03/15/23 1331)      Nutrition Assessment:    Nutritional status remains adequate at this time, intake appropriate to meet estimated needs, glucose under good control with oral meds. No new recommedations at this time, current weight ordered for furhter assessment    Nutrition Related Findings:    Admitted for cellulitis, IV ATBx, new DM diagnosis, glucose currently < 140 with oral meds, No GI &/or nutrition related complaints   Wound Type: Diabetic Ulcer       Current Nutrition Intake & Therapies:    Average Meal Intake: %  Average Supplements Intake: None Ordered  ADULT DIET; Regular; 5 carb choices (75 gm/meal)    Anthropometric Measures:  Height: 5' 10\" (177.8 cm)  Ideal Body Weight (IBW): 166 lbs (75 kg)    Admission Body Weight: 234 lb (106.1 kg) ( 3.8.23) no recent wt  Current Body Weight: 234 lb (106.1 kg) (3/5/23), 141 % IBW.  Weight Source: Bed Scale  Current BMI (kg/m2): 33.6  Usual Body Weight: 235 lb (106.6 kg) (pt reported)  % Weight Change (Calculated): -0.4                         Estimated Daily Nutrient Needs:  Energy Requirements Based On: Kcal/kg  Weight Used for Energy Requirements: Current  Energy (kcal/day): 1559-0924 (17-19kcal/kg)  Weight Used for Protein Requirements: Ideal  Protein (g/day): ~1135gm  Method Used for Fluid Requirements: 1 ml/kcal  Fluid (ml/day): ~6650-7830    Nutrition Diagnosis:   Altered nutrition-related lab values related to endocrine dysfuntion as evidenced by lab values  Food & Nutrition-related knowledge deficit related to endocrine dysfuntion as evidenced by other (comment) (new DM dx)    Nutrition Interventions:   Food and/or Nutrient Delivery: Continue Current Diet  Nutrition Education/Counseling:
Comprehensive Nutrition Assessment    Type and Reason for Visit:  Reassess    Nutrition Recommendations/Plan:   Continue with diet as ordered  PO >75% Meals. Adequate fluid for hydration. Malnutrition Assessment:  Malnutrition Status:  No malnutrition (03/15/23 1331)      Nutrition Assessment:    Nutritional status remains adequate at this time, intake appropriate to meet estimated needs, glucose under good control with oral meds. No new recommedations at this time, Current weight 3/26/23 229. 5#. Weight down ~ 5# since admit. PO adequate. Nutrition Related Findings:    Admitted for cellulitis, IV ATBx, new DM diagnosis, glucose currently < 140 with oral meds, No GI &/or nutrition related complaints; Last BM 3/28 per EMR. Labs reviewed (3/29). Wound Type: Diabetic Ulcer       Current Nutrition Intake & Therapies:    Average Meal Intake: %  Average Supplements Intake: None Ordered  ADULT DIET; Regular; 5 carb choices (75 gm/meal)    Anthropometric Measures:  Height: 5' 10\" (177.8 cm)  Ideal Body Weight (IBW): 166 lbs (75 kg)    Admission Body Weight: 234 lb (106.1 kg)  Current Body Weight: 229 lb (103.9 kg) (3/26/23), 141 % IBW.  Weight Source: Bed Scale  Current BMI (kg/m2): 32.9  Usual Body Weight: 235 lb (106.6 kg) (pt reported)  % Weight Change (Calculated): -0.4      Estimated Daily Nutrient Needs:  Energy Requirements Based On: Kcal/kg  Weight Used for Energy Requirements: Admission  Energy (kcal/day): 9810-0182 (17-19kcal/kg)  Weight Used for Protein Requirements: Ideal  Protein (g/day): ~1135gm  Method Used for Fluid Requirements: 1 ml/kcal  Fluid (ml/day): ~1730-4830    Nutrition Diagnosis:   Altered nutrition-related lab values related to endocrine dysfuntion as evidenced by lab values  Food & Nutrition-related knowledge deficit related to endocrine dysfuntion as evidenced by other (comment) (new DM dx)    Nutrition Interventions:   Food and/or Nutrient Delivery: Continue Current
Comprehensive Nutrition Assessment    Type and Reason for Visit:  Reassess    Nutrition Recommendations/Plan:   PO >75% Meals  Adequate fluids for hydration     Malnutrition Assessment:  Malnutrition Status:  No malnutrition (03/15/23 1331)        Nutrition Assessment:    No s/s of malnutrition. New DM diagnosis. Patient diabetes diet education provided on 3/6 and 3/8. Will continue to follow up and answer diet questions while in house. Diet information and RD contact information provided. Pt verbalizes understanding of diet. Pt reports good appetite, adequate PO Intake. Nutrition Related Findings:    Admitted related to cellulitis and new DM diagnosis. Admit weight 234#, BMI 33.7- obese. Labs reviewed 3/5 hemoglobin A1c 11.8. Accuchecks reviewed- remain high, wound/infection may be contributing to elevated blood glucose. Labs from 3/13 reviewed; Sonia Ballard DM diet, 5 carbs per meal ordered. Skin- Diabetic Ulcer wound to left great toe noted. Meds reviewed. Pt reports fair/good appetite- feels medication is affecting appetite somewhat but overall eating well. PO intake per EMR %meals. Last BM per EMR 3/12/23 Wound Type: Diabetic Ulcer       Current Nutrition Intake & Therapies:    Average Meal Intake: %  Average Supplements Intake: None Ordered  ADULT DIET; Regular; 5 carb choices (75 gm/meal)    Anthropometric Measures:  Height: 5' 10\" (177.8 cm)  Ideal Body Weight (IBW): 166 lbs (75 kg)    Admission Body Weight: 234 lb (106.1 kg)  Current Body Weight: 234 lb (106.1 kg) (3/5/23), 141 % IBW.  Weight Source: Bed Scale  Current BMI (kg/m2): 33.6  Usual Body Weight: 235 lb (106.6 kg) (pt reported)  % Weight Change (Calculated): -0.4      Estimated Daily Nutrient Needs:  Energy Requirements Based On: Kcal/kg  Weight Used for Energy Requirements: Current  Energy (kcal/day): 9344-3987 (17-19kcal/kg)  Weight Used for Protein Requirements: Ideal  Protein (g/day): ~1135gm  Method Used for Fluid Requirements: 1
Dr Crystal Vieyra made aware of the patients Hscrp increasing to 201. 3. He states he is contacting Dr Perico Duke.
Dr Jim medrano served that she will be in to see the patient on 3/30.
Dressing changed to patients left great toe with 4x4 soaked with Dakins, 4x4 and kerlix wrap. Pt tolerated well, denies pain. Resting comfortably in bed.
Follow-up in a patient. Patient is feeling well. No new symptoms. No chest pain or palpitation. No headaches, loss of conscious or seizure. Exam: General appearance: alert, appears stated age and cooperative, General appearance: alert, appears stated age and cooperative,  Skin: Skin color, texture, turgor normal. No rashes or lesions  HEENT: Head: Normocephalic, no lesions, without obvious abnormality. Neck: no adenopathy, no carotid bruit, no JVD, supple, symmetrical, trachea midline, and thyroid not enlarged, symmetric, no tenderness/mass/nodules  Lungs: clear to auscultation bilaterally  Heart: regular rate and rhythm, S1, S2 normal, no murmur, click, rub or gallop  Abdomen: soft, non-tender; bowel sounds normal; no masses,  no organomegaly  Extremities: Resolution of the erythema involving the anterior, medial and the lateral left shin, dorsal foot and great toe. Stage II ulceration at least involving the plantar aspect of the left great toe measuring about half an inch in diameter. Neurologic: Mental status: Alert, oriented, thought content appropriate. Assessment and plan:      *Osteomyelitis of the left great toe. Dr Marshall Morris recommend 6 IV of IV Zosyn. Subsequently Dr. Isamar Amado recommended 4 weeks of IV Zosyn followed by 2 weeks of oral antibiotic. *Cellulitis of the left shin, ankle and dorsal foot, resolved. *Peripheral vascular disease. Peroneal artery stenosis versus occlusion. No clinical evidence to suggest acute limb ischemia. The foot is warm with faint dorsalis pedis pulse. Patient may need to have vascular evaluation to be done electively. We will arrange for that. *Newly diagnosed hypertension. Much better improved after the initiation of lisinopril 20 mg daily     *Newly diagnosed diabetes mellitus. Much improved after the initiation of oral medications.      *DVT prophylaxis
Hospitalist Progress Note      PCP: Clementina Archer MD    Date of Admission: 3/9/2023    Chief Complaint: none today    Subjective: pt awake/alert     Medications:  Reviewed    Infusion Medications    sodium chloride      dextrose      sodium chloride       Scheduled Medications    lactobacillus acidophilus  2 tablet Oral TID    lisinopril  10 mg Oral Daily    ammonium lactate   Topical BID    glipiZIDE  2.5 mg Oral BID AC    insulin lispro  0-8 Units SubCUTAneous BID WC    enoxaparin  30 mg SubCUTAneous BID    aspirin  81 mg Oral Daily    Sodium Hypochlorite   Irrigation Daily    metFORMIN  500 mg Oral BID WC    sodium chloride flush  5-40 mL IntraVENous 2 times per day    piperacillin-tazobactam  3,375 mg IntraVENous Q8H     PRN Meds: sodium chloride flush, sodium chloride, promethazine **OR** ondansetron, polyethylene glycol, acetaminophen **OR** acetaminophen, glucose, dextrose bolus **OR** dextrose bolus, glucagon (rDNA), dextrose, sodium chloride flush, sodium chloride      Intake/Output Summary (Last 24 hours) at 3/25/2023 0749  Last data filed at 3/24/2023 0834  Gross per 24 hour   Intake 240 ml   Output --   Net 240 ml       Exam:    /81   Pulse 84   Temp 98.2 °F (36.8 °C) (Oral)   Resp 18   Ht 5' 10\" (1.778 m)   Wt 234 lb (106.1 kg)   SpO2 98%   BMI 33.58 kg/m²     General appearance: No apparent distress, appears stated age and cooperative. HEENT: Pupils equal, round, and reactive to light. Conjunctivae/corneas clear. Neck: Supple, with full range of motion. No jugular venous distention. Trachea midline. Respiratory:  Normal respiratory effort. Clear to auscultation, bilaterally without Rales/Wheezes/Rhonchi. Cardiovascular: Regular rate and rhythm with normal S1/S2 without murmurs, rubs or gallops. Abdomen: Soft, non-tender, non-distended with normal bowel sounds. Musculoskeletal: L foot intact dressing   Skin: Skin color, texture, turgor normal.  No rashes or lesions.   Neurologic:
Hospitalist Progress Note      PCP: Ramos Lynn MD    Date of Admission: 3/9/2023    Chief Complaint: diarrhea    Subjective: pt awake/alert     Medications:  Reviewed    Infusion Medications    sodium chloride      dextrose      sodium chloride       Scheduled Medications    lisinopril  10 mg Oral Daily    ammonium lactate   Topical BID    glipiZIDE  2.5 mg Oral BID AC    insulin lispro  0-8 Units SubCUTAneous BID WC    enoxaparin  30 mg SubCUTAneous BID    aspirin  81 mg Oral Daily    Sodium Hypochlorite   Irrigation Daily    metFORMIN  500 mg Oral BID WC    sodium chloride flush  5-40 mL IntraVENous 2 times per day    piperacillin-tazobactam  3,375 mg IntraVENous Q8H     PRN Meds: sodium chloride flush, sodium chloride, promethazine **OR** ondansetron, polyethylene glycol, acetaminophen **OR** acetaminophen, glucose, dextrose bolus **OR** dextrose bolus, glucagon (rDNA), dextrose, sodium chloride flush, sodium chloride      Intake/Output Summary (Last 24 hours) at 3/20/2023 1016  Last data filed at 3/20/2023 0815  Gross per 24 hour   Intake 360 ml   Output --   Net 360 ml       Exam:    /81   Pulse 68   Temp 97.3 °F (36.3 °C) (Oral)   Resp 18   Ht 5' 10\" (1.778 m)   Wt 234 lb (106.1 kg)   SpO2 100%   BMI 33.58 kg/m²     General appearance: No apparent distress, appears stated age and cooperative. HEENT: Pupils equal, round, and reactive to light. Conjunctivae/corneas clear. Neck: Supple, with full range of motion. No jugular venous distention. Trachea midline. Respiratory:  Normal respiratory effort. Clear to auscultation, bilaterally without Rales/Wheezes/Rhonchi. Cardiovascular: Regular rate and rhythm with normal S1/S2 without murmurs, rubs or gallops. Abdomen: Soft, non-tender, non-distended with normal bowel sounds. Musculoskeletal: L great toe edema, plantar ulceration   Skin: Skin color, texture, turgor normal.  No rashes or lesions.   Neurologic:  Neurovascularly intact without
Met with patient  for weekly care conference, current diet remains appropriate and well tolerated. Pt reports decreased/fair appetite related to antibiotic. Agreeable to oral nutrition supplement once daily. No d/c needs identified at this time. RD to continue to monitor for duration of admission.
Met with patient for weekly care conference, current diet remains appropriate and well tolerated . Intake appears adequate to meet estimated needs at this time. No d/c needs identified at this time, continue diet education as needed. RD to continue to monitor for duration of admission.
No new changes to patients condition. Continuing treatment for osteomyelitis of the left great toe. Iv Zosyn infusing as ordered. Po meds for new onset DM and htn given. Pt has some concerns today regarding a letter he received from his insurance company related to imaging coverage. He is requesting guidance from the LSW. Pt is alert and oriented, dressing in place. Call light in reach.
No new symptoms. Patient is feeling well. ROS: 12 system review otherwise is negative for acute signs or symptoms over the last 24 hrs. Exam: General appearance: alert, appears stated age and cooperative, General appearance: alert, appears stated age and cooperative,  Skin: Skin color, texture, turgor normal. No rashes or lesions  HEENT: Head: Normocephalic, no lesions, without obvious abnormality. Neck: no adenopathy, no carotid bruit, no JVD, supple, symmetrical, trachea midline, and thyroid not enlarged, symmetric, no tenderness/mass/nodules  Lungs: clear to auscultation bilaterally  Heart: regular rate and rhythm, S1, S2 normal, no murmur, click, rub or gallop  Abdomen: soft, non-tender; bowel sounds normal; no masses,  no organomegaly  Extremities: Significant improvement of the erythema involving the anterior, medial and the lateral left shin, dorsal foot and great toe. Stage II ulceration at least involving the plantar aspect of the left great toe measuring about half an inch in diameter. Neurologic: Mental status: Alert, oriented, thought content appropriate. Assessment and plan:     *Osteomyelitis of the left great toe. Dr Ana Rosa Stout recommend 6 IV of IV Zosyn. *Infected left great toe ulcer with cellulitis extending into the dorsal aspect of the foot, medial ankle, anterior and medial shin area. Much improved. Continue IV antibiotic. *Peripheral vascular disease. Peroneal artery stenosis versus occlusion. No clinical evidence to suggest acute limb ischemia. The foot is warm with faint dorsalis pedis pulse. Patient may need to have vascular evaluation to be done electively. We will arrange for that. *Newly diagnosed hypertension. Much better improved after the initiation of lisinopril 20 mg daily     *Newly diagnosed diabetes mellitus. Much improved after the initiation of oral medications.      *DVT prophylaxis      I may or may not have addressed all of this pt symptoms,
Nutrition Note    Met with patient for weekly care conference, current Carb Control 5 diet remains appropriate and well tolerated . Intake appears adequate to meet estimated needs at this time. Has received diabetic diet education with glucose under good control with oral meds. Current weight ordered for further assessment. RDN to continue to monitor for duration of admission.       Electronically signed by Ary Lemus RD, YOLANDA on 3/22/23 at 10:44 AM EDT
Patient denies pain, aware of plan of care for long term antibiotics. PICC line flushed and patent. Pt denies needs.
Patient in bed, Dr Crystal Vieyra in room rounding with patient. Pt's left ankle and shin are red with lines of demarcation with redness moving past the lines. His left great toe is edematous and red. No drainage noted, dressing changed. Lotion applied to feet.
Patient in bed, IV zosyn infused without issue, pt took po meds as ordered. Denies any needs, call light in reach.
Patient up in bed, IV antibiotic infusing into PICC without any issue. Pt is alert and oriented, denies pain. Medicated as ordered.
Patient up in room, making bed. Took po meds, iv antibiotic infusing without issue. Pt is aware of scheduled care conference meeting for today. Denies pain, up independently, no needs.
Patient's dressing to left great toe changed and wound has improved in appearance. Pt denies pain. Up as tolerated, call light in reach.
Perfect serve sent to Dr Sapphire Galloway regarding left great toe debridement.
Podiatry Progress Note  Rob Vega  Subjective :   Patient seen and examined this evening. Feeling better, fever resolved. I was asked to re-evaluate patient due to very high CRP and potential for worsening infection. Patient consented for excisional debridement left hallux wound. Objective     /81   Pulse 78   Temp 99.7 °F (37.6 °C) (Oral)   Resp 18   Ht 5' 10\" (1.778 m)   Wt 229 lb 8 oz (104.1 kg)   SpO2 94%   BMI 32.93 kg/m²      I/O:  Intake/Output Summary (Last 24 hours) at 3/29/2023 1954  Last data filed at 3/29/2023 1236  Gross per 24 hour   Intake 560 ml   Output --   Net 560 ml              Wt Readings from Last 3 Encounters:   03/26/23 229 lb 8 oz (104.1 kg)   03/05/23 234 lb 3.2 oz (106.2 kg)       LABS:    Recent Labs     03/27/23  0602   WBC 8.3   HGB 12.9*   HCT 38.3*   *        Recent Labs     03/29/23  0550      K 3.7      CO2 19*   BUN 36*   CREATININE 2.28*        Recent Labs     03/27/23  0602   PROT 6.2*         Exam:     DERMATOLOGIC:   Ulceration nearly healed. Periwound callus is present. Sharp excisional debridement to pink viable skin to periwound area, epithelializing pink granular tissue at wound bed. Fissureed partial thickness open wound remains, 5mm length, <1mm width, no measurable depth. No edema to great toe, mild erythema present. Patchy scaling erythema present to dorsal foot and medial lower leg without ascending lymphangitis. Palpable warmth without excoriations or open wound.      MRI  MRI FOOT LEFT W WO CONTRAST [[id number]]    Collected: 03/07/23 1700    Updated: 03/07/23 1709    Narrative:     EXAMINATION:   MRI OF THE LEFT FOOT WITH AND WITHOUT CONTRAST, 3/7/2023 4:20 pm     TECHNIQUE:   Multiplanar multisequence MRI of the left foot was performed with and without   the administration of intravenous contrast.     COMPARISON:   None     HISTORY:   ORDERING SYSTEM PROVIDED HISTORY: oseomyelitis left great toe   TECHNOLOGIST PROVIDED
Progress Note  Date:3/27/2023       Room:0212/0212-01  Patient Sulma Matias     Date of Birth:[birth year]     Age:62 y.o. Subjective    Subjective:  Symptoms:  He reports malaise and weakness. No shortness of breath, cough, chest pain, chest pressure, anorexia, diarrhea or anxiety. Diet:  No nausea or vomiting. Review of Systems   Respiratory:  Negative for cough and shortness of breath. Cardiovascular:  Negative for chest pain. Gastrointestinal:  Negative for anorexia, diarrhea, nausea and vomiting. Neurological:  Positive for weakness. Objective         Vitals Last 24 Hours:  TEMPERATURE:  Temp  Av.7 °F (37.6 °C)  Min: 99.3 °F (37.4 °C)  Max: 100.2 °F (37.9 °C)  RESPIRATIONS RANGE: Resp  Av  Min: 18  Max: 18  PULSE OXIMETRY RANGE: SpO2  Av.3 %  Min: 95 %  Max: 99 %  PULSE RANGE: Pulse  Av  Min: 71  Max: 80  BLOOD PRESSURE RANGE: Systolic (85RBR), SAY:473 , Min:112 , HZL:664   ; Diastolic (05KJY), BZV:07, Min:70, Max:76    I/O (24Hr): Intake/Output Summary (Last 24 hours) at 3/27/2023 1011  Last data filed at 3/26/2023 2053  Gross per 24 hour   Intake 370 ml   Output --   Net 370 ml     Objective:  General Appearance:  Comfortable. Vital signs: (most recent): Blood pressure 112/70, pulse 71, temperature 100.2 °F (37.9 °C), temperature source Oral, resp. rate 18, height 5' 10\" (1.778 m), weight 229 lb 8 oz (104.1 kg), SpO2 95 %. HEENT: Normal HEENT exam.    Lungs: There are decreased breath sounds. Heart: S1 normal and S2 normal.    Abdomen: Abdomen is soft. Bowel sounds are normal.   There is no epigastric area or suprapubic area tenderness. Pulses: Distal pulses are intact. Neurological: Patient is alert. Pupils:  Pupils are equal, round, and reactive to light. Skin:  Warm.     Labs/Imaging/Diagnostics    Labs:  CBC:  Recent Labs     23  0602   WBC 8.3   RBC 4.33*   HGB 12.9*   HCT 38.3*   MCV 88.5   RDW 12.8   *
Progress Note  Date:3/28/2023       Room:0212/0212-01  Patient Reginaldo Query     Date of Birth:[birth year]     Age:62 y.o. Subjective    Subjective:  Symptoms:  He reports malaise and weakness. No shortness of breath, cough, chest pain, chest pressure, anorexia, diarrhea or anxiety. Diet:  No nausea or vomiting. Review of Systems   Respiratory:  Negative for cough and shortness of breath. Cardiovascular:  Negative for chest pain. Gastrointestinal:  Negative for anorexia, diarrhea, nausea and vomiting. Neurological:  Positive for weakness. Objective         Vitals Last 24 Hours:  TEMPERATURE:  Temp  Av.7 °F (37.6 °C)  Min: 99.2 °F (37.3 °C)  Max: 100.4 °F (38 °C)  RESPIRATIONS RANGE: No data recorded  PULSE OXIMETRY RANGE: SpO2  Av %  Min: 95 %  Max: 95 %  PULSE RANGE: Pulse  Av  Min: 78  Max: 78  BLOOD PRESSURE RANGE: Systolic (76SND), HRJ:675 , Min:117 , AUR:058   ; Diastolic (77ZAL), MTE:08, Min:73, Max:73    I/O (24Hr): No intake or output data in the 24 hours ending 23 09    Objective:  General Appearance:  Comfortable. Vital signs: (most recent): Blood pressure 117/73, pulse 78, temperature 100.4 °F (38 °C), temperature source Oral, resp. rate 18, height 5' 10\" (1.778 m), weight 229 lb 8 oz (104.1 kg), SpO2 95 %. HEENT: Normal HEENT exam.    Lungs: There are decreased breath sounds. Heart: S1 normal and S2 normal.    Abdomen: Abdomen is soft. Bowel sounds are normal.   There is no epigastric area or suprapubic area tenderness. Pulses: Distal pulses are intact. Neurological: Patient is alert. Pupils:  Pupils are equal, round, and reactive to light. Skin:  Warm.     Labs/Imaging/Diagnostics    Labs:  CBC:  Recent Labs     23  0602   WBC 8.3   RBC 4.33*   HGB 12.9*   HCT 38.3*   MCV 88.5   RDW 12.8   *       CHEMISTRIES:  Recent Labs     23  0602 23  0540    135   K 3.9 3.8    103   CO2 23 22   BUN
Progress Note  Date:3/29/2023       Room:0212/0212-01  Patient Shante Siddiqui     Date of Birth:[birth year]     Age:62 y.o. Subjective    Subjective:  Symptoms:  He reports malaise and weakness. No shortness of breath, cough, chest pain, chest pressure, anorexia, diarrhea or anxiety. Diet:  No nausea or vomiting. Review of Systems   Respiratory:  Negative for cough and shortness of breath. Cardiovascular:  Negative for chest pain. Gastrointestinal:  Negative for anorexia, diarrhea, nausea and vomiting. Neurological:  Positive for weakness. Objective         Vitals Last 24 Hours:  TEMPERATURE:  Temp  Av.3 °F (37.4 °C)  Min: 98.9 °F (37.2 °C)  Max: 99.7 °F (37.6 °C)  RESPIRATIONS RANGE: Resp  Av  Min: 18  Max: 18  PULSE OXIMETRY RANGE: SpO2  Av %  Min: 94 %  Max: 94 %  PULSE RANGE: Pulse  Av  Min: 78  Max: 78  BLOOD PRESSURE RANGE: Systolic (43EIJ), AFJ:352 , Min:128 , WQY:682   ; Diastolic (74OGG), GXE:24, Min:81, Max:81    I/O (24Hr): Intake/Output Summary (Last 24 hours) at 3/29/2023 0857  Last data filed at 3/29/2023 0835  Gross per 24 hour   Intake 1280 ml   Output --   Net 1280 ml       Objective:  General Appearance:  Comfortable. Vital signs: (most recent): Blood pressure 128/81, pulse 78, temperature 99.7 °F (37.6 °C), temperature source Oral, resp. rate 18, height 5' 10\" (1.778 m), weight 229 lb 8 oz (104.1 kg), SpO2 94 %. HEENT: Normal HEENT exam.    Lungs: There are decreased breath sounds. Heart: S1 normal and S2 normal.    Abdomen: Abdomen is soft. Bowel sounds are normal.   There is no epigastric area or suprapubic area tenderness. Pulses: Distal pulses are intact. Neurological: Patient is alert. Pupils:  Pupils are equal, round, and reactive to light. Skin:  Warm.     Labs/Imaging/Diagnostics    Labs:  CBC:  Recent Labs     23  0602   WBC 8.3   RBC 4.33*   HGB 12.9*   HCT 38.3*   MCV 88.5   RDW 12.8   *
Psychosocial Assessment     Physical Appearance: Average    Dress: Neat    Hygiene: Good    Speech: Coherent    Affect/Mood: Appropriate, Calm, and Cooperative    Alert and Orientated: Person and Place and situation    Thought Process: Relevant    Behavior: Cooperative    Resides: Patient reports residing in [facility]     DME: Other Patient reports not owning or needing any DME     Support System: lives alone.   Patient identifies niece as supportive     History of Mental Health: Patient reports no past or current mental health issues     Suicidal/ Homicidal:  No, \" I have my afia\"     Food: Patient reports that he grocery shops and prepares own food     Medication: [facility] [place]     Transportation:Yes      Help at home needs: patient identifies no help at home needs at this time     DC Plan: Patient plans to return home upon completing IV antibiotics     DC Recommendations:  SS to continue to follow and monitor patient's progress for DC needs     Plan for transition of care is related to the following treatment goals: \" I'd like to see my wound heal and back to normal\"       The patient was provided with choice of provider, and agrees with the discharge plan: on-going         The patient was provided with choice of all post acute providers and agrees with the discharge plan: on-going     Electronically signed by KIARA Maurer on 3/14/2023 at 7:46 PM
Pt a and o x4. Head to toe complete. Pt has Right PICC. PICC dressing changed per protocol using sterile technique. + flush and BR. Left great toe dressing changed per orders. No drainage noted, dry. Dakins wet to dry applied. Dressing change is BID. Denies pain. No other needs at this time. Up independent in room.
[facility] for [facility] [place].
Pt is A&O x 4. Pleasant and cooperative with staff and care. Able to use call light to make needs known. Pt able to Don/Doff orthopaedic shoe to left foot. Dressing changed to left great toe as ordered. Continues on Zosyn IV ATB with no adverse reactions noted. No further complaints voiced. Call light within reach. Safety maintained.
Pt is A+O x 4 currently receiving Zosyn IV. Pt has a double lumen PICC to the Right arm. Blood return  noted and flushes well. Pt is resting comfortably in bed voices no concerns at this time. with call light within reach.
Report called to [facility] on 4wt in [facility].   time for transport is 10pm.
Resting quietly in bed with eyes closed. Respirations even and unlabored. Alert and oriented x4. Independent in room. Continent of bowel and bladder. Dressing chage to (L) great toe completed this shift. No redness, warmth or edema noted. No drainage noted. Pt tolerated well. Denies c/o pain or discomfort. Call light and over bed table within reach.
Spiritual Care Services     Summary of Visit:  Pt continues to seek to clarify and to express his changing Advent beliefs. Grateful to be able to do so and to feel spiritually supported while here in hospital. Important for his care. Pt carries hope to make lifestyle changes that will lead to his being able to stop taking medication at some point in future. Encourage pt to discuss this with his physicians. Encounter Summary  Encounter Overview/Reason : Spiritual/Emotional Needs  Service Provided For[de-identified] Patient  Referral/Consult From[de-identified] Rounding  Support System: Family members  Last Encounter : 03/09/23  Complexity of Encounter: Low  Begin Time: 7434  End Time : 1240  Total Time Calculated: 20 min  Encounter   Type: Follow up     Spiritual/Emotional needs  Type: Spiritual Support     Grief, Loss, and Adjustments  Type: Life Adjustments                Spiritual Assessment/Intervention/Outcomes:    Assessment: Calm, Questioning afia    Intervention: Active listening, Discussed belief system/Advent practices/afia, Discussed relationship with God    Outcome: Receptive      Care Plan:    Plan and Referrals  Plan/Referrals: Continue Support (comment)          Spiritual Care Services   Electronically signed by Dottie Cantu [facility] on 3/23/2023 at 12:22 PM.    To reach a  for emotional and spiritual support, place an [facility] consult request.   If a  is needed immediately, dial 0 and ask to page the on-call .
as well. No erythema and no drainage. Ulcer see with nurse at bedside. Neuro exam: CN II-XII intact, facial expressions symmertrical bilaterally, no slurred speech, muscle strength equal bilaterally        Lab Results   Component Value Date    WBC 6.5 03/16/2023    HGB 15.1 03/16/2023    HCT 45.5 03/16/2023    MCV 90.1 03/16/2023     03/16/2023     Lab Results   Component Value Date/Time     03/16/2023 11:19 AM    K 4.2 03/16/2023 11:19 AM    K 3.8 03/06/2023 06:03 AM     03/16/2023 11:19 AM    CO2 23 03/16/2023 11:19 AM    BUN 26 03/16/2023 11:19 AM    CREATININE 0.97 03/16/2023 11:19 AM    GLUCOSE 178 03/16/2023 11:19 AM    CALCIUM 9.5 03/16/2023 11:19 AM        WBC trends are being monitored. Antibiotic doses are being adjusted per most recent renal labs. Vitals:    03/16/23 0530   BP: 120/71   Pulse: 64   Resp:    Temp:    SpO2: 98%           Patient Active Problem List   Diagnosis    Cellulitis    Osteomyelitis of great toe of left foot (HCC)    Osteomyelitis (HCC)     Last CRP 3/13/23 was 3.5      ASSESSMENT:  Cellulitis L great toe with OM   Polymicrobial wound infection  PVD      PLAN:  6 weeks of Zosyn from 3/6/2023 is the current plan. He may stay in for 4 weeks then do PO option for remainder. Options discussed with him. Weekly CRP and BMP  Wound care      Imaging and labs were reviewed per medical records and any ID pertinent labs were also addressed    Time spent today in combination of reviewing patient's chart, medications, labs, pictures when pertinent, provider communication as necessary, counseling patient, care/coordination not otherwise reported here, and patient face to face virtual visit 37 min.   >50% of that time spent counseling and coordination of patient care  Patient was also appropriately counseled on preventive measures such as vaccinations, the importance of annual exam with their PCP, and the importance of health maintenance by dietary and exercise regimens.
interstitial nephritis  Elevated CRP    PLAN:  He is on a 6 weeks course of abx from 3/6/2023  Check urine for eos  Change category of abx to IV Levaquin at renal dose for now then PO Levaquin if he tolerates it. Monitor fever curve and rash and Cr and the CRP. - repeat WBC, CRP, BCx 2 due to fever. Wound care. If the CRP worsens, may consider vasculitis in the differential and he may benefit from evaluation/ biopsy  Monitor the urine for EOS. Either way, will change from Zosyn to [facility] [place] and labs were reviewed per medical records and any ID pertinent labs were also addressed    Time spent today in combination of reviewing patient's chart, medications, labs, pictures when pertinent, provider communication as necessary, counseling patient, care/coordination not otherwise reported here, and patient face to face virtual visit 36 min.   >50% of that time spent counseling and coordination of patient care  Patient was also appropriately counseled on preventive measures such as vaccinations, the importance of annual exam with their PCP, and the importance of health maintenance by dietary and exercise regimens. All questions were answered from an ID perspective and to the best of my ability. Both the patient and the provider were located within the state in which the provider is licensed to practice.        Andreia Fajardo, DO

## 2023-03-30 NOTE — PROGRESS NOTES
Renal Progress Note    Assessment and Plan:      58 yo man with no previous medical history. Admitted with osteomyelitis. Hba1c 11.8 and hypertensive. Placed on lisinopril and dm meds. Has had normal kidney function entire hopsitalization last few weeks. Now with DAVINA. Possible ATN from abx. Could also be AIN given urine eos + , rash as well as no fast improvement from IVF and med adjustment     Plan/  1- IVF and hold lisinpril and metformin  2-  renal u/s was ok  3. Agree with changing zosyn to levaquin  4. I think he most likely has AIN from zosyn    Patient Active Problem List:     Cellulitis     Osteomyelitis of great toe of left foot ([facility] [address]     Osteomyelitis ([facility] [address]     DAVINA (acute kidney injury) ([facility] [address]      Subjective:   Admit Date: 3/30/2023    Interval History: pt transferred due to rash. His zosyn was changed to levaquin. Cr stable but not improved      Medications:   Scheduled Meds:   enoxaparin  30 mg SubCUTAneous BID    aspirin  81 mg Oral Daily    Sodium Hypochlorite   Irrigation Daily    sodium chloride flush  5-40 mL IntraVENous 2 times per day    insulin lispro  0-8 Units SubCUTAneous BID WC    glipiZIDE  2.5 mg Oral BID AC    ammonium lactate   Topical BID    lactobacillus acidophilus  2 tablet Oral TID    levoFLOXacin  250 mg Oral Daily    pill splitter   Does not apply Once     Continuous Infusions:   sodium chloride      dextrose      sodium chloride      sodium chloride 100 mL/hr at 03/30/23 0654       CBC:   Recent Labs     03/30/23  0600   WBC 9.2   HGB 12.6*        CMP:    Recent Labs     03/28/23  0540 03/29/23  0550 03/30/23  0600    138 138   K 3.8 3.7 4.2    107 107   CO2 22 19* 20   BUN 35* 36* 37*   CREATININE 2.28* 2.28* 2.34*   GLUCOSE 102* 97 86   CALCIUM 8.4* 7.7* 8.2*   LABGLOM 31.1* 31.1* 30.1*     Troponin: No results for input(s): TROPONINI in the last 72 hours. BNP: No results for input(s): BNP in the last 72 hours.   INR: No results for input(s): INR in the last 72 hours. Lipids: No results for input(s): CHOL, LDLDIRECT, TRIG, HDL, AMYLASE, LIPASE in the last 72 hours. Liver: No results for input(s): AST, ALT, ALKPHOS, PROT, LABALBU, BILITOT in the last 72 hours. Invalid input(s): BILDIR  Iron:  No results for input(s): IRONS, FERRITIN in the last 72 hours. Invalid input(s): LABIRONS  Urinalysis: No results for input(s): UA in the last 72 hours. Objective:   Vitals: /79   Pulse 77   Temp 99.5 °F (37.5 °C) (Oral)   Resp 18   Ht 5' 10\" (1.778 m)   Wt 236 lb (107 kg)   SpO2 97%   BMI 33.86 kg/m²    Wt Readings from Last 3 Encounters:   03/30/23 236 lb (107 kg)   03/26/23 229 lb 8 oz (104.1 kg)   03/05/23 234 lb 3.2 oz (106.2 kg)      24HR INTAKE/OUTPUT:    Intake/Output Summary (Last 24 hours) at 3/30/2023 0816  Last data filed at 3/30/2023 0654  Gross per 24 hour   Intake 924.85 ml   Output --   Net 924.85 ml       Constitutional:  Alert, awake, no apparent distress   Skin:normal, no rash  HEENT:sclera anicteric.   Head atraumatic normocephalic  Neck:supple with no thyromegally  Cardiovascular:  S1, S2 without m/r/g   Respiratory:  CTA B without w/r/r   Abdomen: +bs, soft, nt  Ext: no LE edema  Musculoskeletal:Intact  Neuro:Alert and oriented with no deficit      Electronically signed by Nicole Lane MD on 3/30/2023 at 8:16 AM

## 2023-03-31 LAB
ANION GAP SERPL CALCULATED.3IONS-SCNC: 10 MEQ/L (ref 9–15)
BUN SERPL-MCNC: 39 MG/DL (ref 8–23)
CALCIUM SERPL-MCNC: 7.8 MG/DL (ref 8.5–9.9)
CHLORIDE SERPL-SCNC: 111 MEQ/L (ref 95–107)
CO2 SERPL-SCNC: 20 MEQ/L (ref 20–31)
CREAT SERPL-MCNC: 2.42 MG/DL (ref 0.7–1.2)
ERYTHROCYTE [SEDIMENTATION RATE] IN BLOOD BY WESTERGREN METHOD: 93 MM (ref 0–20)
GLUCOSE BLD-MCNC: 100 MG/DL (ref 70–99)
GLUCOSE BLD-MCNC: 103 MG/DL (ref 70–99)
GLUCOSE BLD-MCNC: 105 MG/DL (ref 70–99)
GLUCOSE SERPL-MCNC: 100 MG/DL (ref 70–99)
PERFORMED ON: ABNORMAL
POTASSIUM SERPL-SCNC: 4.3 MEQ/L (ref 3.4–4.9)
SODIUM SERPL-SCNC: 141 MEQ/L (ref 135–144)

## 2023-03-31 PROCEDURE — 2580000003 HC RX 258: Performed by: INTERNAL MEDICINE

## 2023-03-31 PROCEDURE — 6370000000 HC RX 637 (ALT 250 FOR IP): Performed by: INTERNAL MEDICINE

## 2023-03-31 PROCEDURE — 85652 RBC SED RATE AUTOMATED: CPT

## 2023-03-31 PROCEDURE — 99232 SBSQ HOSP IP/OBS MODERATE 35: CPT | Performed by: INTERNAL MEDICINE

## 2023-03-31 PROCEDURE — 80048 BASIC METABOLIC PNL TOTAL CA: CPT

## 2023-03-31 PROCEDURE — 1210000000 HC MED SURG R&B

## 2023-03-31 RX ORDER — LEVOFLOXACIN 250 MG/1
250 TABLET ORAL DAILY
Qty: 10 TABLET | Refills: 0 | Status: SHIPPED | OUTPATIENT
Start: 2023-03-31 | End: 2023-04-03 | Stop reason: SDUPTHER

## 2023-03-31 RX ADMIN — LACTOBACILLUS TAB 1 TABLET: TAB at 09:01

## 2023-03-31 RX ADMIN — LEVOFLOXACIN 250 MG: 250 TABLET, FILM COATED ORAL at 18:01

## 2023-03-31 RX ADMIN — GLIPIZIDE 2.5 MG: 5 TABLET ORAL at 17:59

## 2023-03-31 RX ADMIN — TRIAMCINOLONE ACETONIDE: 1 CREAM TOPICAL at 09:03

## 2023-03-31 RX ADMIN — Medication: at 21:01

## 2023-03-31 RX ADMIN — SODIUM CHLORIDE: 9 INJECTION, SOLUTION INTRAVENOUS at 06:16

## 2023-03-31 RX ADMIN — Medication: at 09:02

## 2023-03-31 RX ADMIN — GLIPIZIDE 2.5 MG: 5 TABLET ORAL at 09:01

## 2023-03-31 RX ADMIN — Medication 10 ML: at 21:02

## 2023-03-31 RX ADMIN — TRIAMCINOLONE ACETONIDE: 1 CREAM TOPICAL at 21:01

## 2023-03-31 RX ADMIN — ASPIRIN 81 MG 81 MG: 81 TABLET ORAL at 09:01

## 2023-03-31 ASSESSMENT — PAIN SCALES - GENERAL: PAINLEVEL_OUTOF10: 0

## 2023-03-31 NOTE — PROGRESS NOTES
Wound Ostomy Continence Nursing    This [id number] [id number] Nurse received referral for evaluation of the toe wound with LE cellulitis. Podiatry and ID are on and managing this concern. Spoke with patient at bedside at this time, denies any other skin concerns or wounds at this time. No needs from this [id number] [facility] Nurse.     Electronically signed by SHADI Ko, RN, Crispin Hands on 3/31/2023 at 8:53 AM

## 2023-03-31 NOTE — PROGRESS NOTES
results for input(s): TROPONINI in the last 72 hours. BNP: No results for input(s): BNP in the last 72 hours. INR: No results for input(s): INR in the last 72 hours. Lipids: No results for input(s): CHOL, LDLDIRECT, TRIG, HDL, AMYLASE, LIPASE in the last 72 hours. Liver: No results for input(s): AST, ALT, ALKPHOS, PROT, LABALBU, BILITOT in the last 72 hours. Invalid input(s): BILDIR  Iron:  No results for input(s): IRONS, FERRITIN in the last 72 hours. Invalid input(s): LABIRONS  Urinalysis: No results for input(s): UA in the last 72 hours. Objective:   Vitals: /73   Pulse 73   Temp 99 °F (37.2 °C) (Oral)   Resp 16   Ht 5' 10\" (1.778 m)   Wt 236 lb (107 kg)   SpO2 98%   BMI 33.86 kg/m²    Wt Readings from Last 3 Encounters:   03/30/23 236 lb (107 kg)   03/26/23 229 lb 8 oz (104.1 kg)   03/05/23 234 lb 3.2 oz (106.2 kg)      24HR INTAKE/OUTPUT:    Intake/Output Summary (Last 24 hours) at 3/31/2023 1047  Last data filed at 3/31/2023 9728  Gross per 24 hour   Intake 3264.5 ml   Output --   Net 3264.5 ml         Constitutional:  Alert, awake, no apparent distress   Skin:normal, no rash  HEENT:sclera anicteric.   Head atraumatic normocephalic  Neck:supple with no thyromegally  Cardiovascular:  S1, S2 without m/r/g   Respiratory:  CTA B without w/r/r   Abdomen: +bs, soft, nt  Ext: no LE edema  Musculoskeletal:Intact  Neuro:Alert and oriented with no deficit      Electronically signed by Dasia Torres MD on 3/31/2023 at 10:47 AM

## 2023-03-31 NOTE — PROGRESS NOTES
Hospitalist Progress Note      Date of Admission: 3/30/2023  Chief Complaint:    No chief complaint on file. Subjective:  No new complaints. No nausea, vomiting, chest pain, or headache      Medications:    Infusion Medications    sodium chloride      dextrose      sodium chloride       Scheduled Medications    enoxaparin  30 mg SubCUTAneous BID    aspirin  81 mg Oral Daily    Sodium Hypochlorite   Irrigation Daily    sodium chloride flush  5-40 mL IntraVENous 2 times per day    insulin lispro  0-8 Units SubCUTAneous BID WC    glipiZIDE  2.5 mg Oral BID AC    ammonium lactate   Topical BID    levoFLOXacin  250 mg Oral Daily    pill splitter   Does not apply Once    lactobacillus acidophilus  1 tablet Oral Daily    triamcinolone   Topical BID     PRN Meds: sodium chloride flush, sodium chloride, promethazine **OR** ondansetron, polyethylene glycol, acetaminophen **OR** acetaminophen, glucose, dextrose bolus **OR** dextrose bolus, glucagon (rDNA), dextrose, sodium chloride flush, sodium chloride    Intake/Output Summary (Last 24 hours) at 3/31/2023 1640  Last data filed at 3/31/2023 0632  Gross per 24 hour   Intake 3264.5 ml   Output --   Net 3264.5 ml     Exam:  /73   Pulse 73   Temp 99 °F (37.2 °C) (Oral)   Resp 16   Ht 5' 10\" (1.778 m)   Wt 236 lb (107 kg)   SpO2 98%   BMI 33.86 kg/m²   Head: Normocephalic, atraumatic  Sclera clear  Neck JVD flat  Lungs: normal effort of breathing  Rash stable on foot. Localized, limited, improving. Labs:   Recent Labs     03/30/23  0600   WBC 9.2   HGB 12.6*   HCT 37.0*        Recent Labs     03/29/23  0550 03/30/23  0600 03/31/23  0600    138 141   K 3.7 4.2 4.3    107 111*   CO2 19* 20 20   BUN 36* 37* 39*   CREATININE 2.28* 2.34* 2.42*   CALCIUM 7.7* 8.2* 7.8*     No results for input(s): INR in the last 72 hours. No results for input(s): Katelmo Forde in the last 72 hours.   Radiology:  XR TOE LEFT (MIN 2 VIEWS)   Final Result

## 2023-03-31 NOTE — CARE COORDINATION
Spoke with Surya Beckwith at [facility] to inquire if auth started yesterday as requested. She states auth not started, will send for auth today. Per Dr. Mathew Genao will need to be monitored for renal function prior to sending patient home. Per supervisor at [facility], patient no longer qualifies for subacute rehab.

## 2023-03-31 NOTE — PROGRESS NOTES
week  Follow-up in 10 days  Continue local wound care and follow-up patient clinically    Discussed with patient    Noris Luna MD

## 2023-03-31 NOTE — CARE COORDINATION
Quality round completed with care management team. Pt came from [facility]. Pt report not wanting to go back to [facility]. Would like to go home. DC plan home. Pending IV ATB. If need IV ATB pt would like [facility] out patient. KIARA will follow.      Electronically signed by KIARA Fenton on 3/31/2023 at 11:04 AM

## 2023-03-31 NOTE — PROGRESS NOTES
Patient resting in bed. No complaints of pain or discomfort. Dressing changed per Podiatry note. Redness improving to LUE. No further needs. Call light in reach. 1800 Patient expressed wanting to know about transfer to [facility] Chiqui served Dr. Julio Cesar Merino and asked about progress. Patient is not accepted at [facility] and will stay here for care. Patient aware.

## 2023-03-31 NOTE — PROGRESS NOTES
Spiritual Care Services     Summary of Visit:  Pt fatigued, has been in hospital most of this month. Battery operated [employer] candles and prayers provided for the pt, and a Passover prayer in anticipation of the coming holiday week. Pt appreciative. Encounter Summary  Encounter Overview/Reason : Spiritual/Emotional Needs  Service Provided For[de-identified] Patient  Referral/Consult From[de-identified] Rounding  Support System: Family members  Last Encounter : 03/30/23  Complexity of Encounter: Low  Begin Time: 0915  End Time : 0930  Total Time Calculated: 15 min  Encounter   Type: Follow up     Spiritual/Emotional needs  Type: Spiritual Support                      Spiritual Assessment/Intervention/Outcomes:    Assessment: Compromised coping    Intervention: Sustaining Presence/Ministry of presence, Discussed meaning/purpose    Outcome: Comfort      Care Plan:    Plan and Referrals  Plan/Referrals: Continue Support (comment)    Spiritual Care Services   Electronically signed by Morena Suárez [facility] on 3/31/2023 at 10:15 AM.    To reach a  for emotional and spiritual support, place an [facility] consult request.   If a  is needed immediately, dial 0 and ask to page the on-call . unable to come in says she has cataract surgery , asking if you can provide an apt for Monday 7/19?

## 2023-03-31 NOTE — PROGRESS NOTES
PODIATRIC MEDICINE AND SURGERY  CONSULT HISTORY AND PHYSICAL    Consulting Service:  Primary service  Opinion/advice regarding: skin rash and toe ulcer  Staff Doctor:  Dr. Timoteo Bumpers     Interval HPI:  - HDS and vital signs stable per recorded vitals   - Patient denies constituional symptoms.   - Resting comfortable in bed. - Denies pain to the feet b/l  - Rash showing signs of minor improvement    ASSESSMENT:  57y.o. year old male with no significant pmh. Partial thickness wound stable without s/s of acute infection. Sent over from [facility] for further workup given significant elevation in lab values and new onset rash. Appears as though potential allergic dermatitis vs. Irritant from rubbing in CAM boot based on location of rash area. Highly unlikely infectious process with foot as source. Wound site stable. Rash sites improving with application of steroid cream.     PLAN AND RECOMMENDATIONS[de-identified]  - Patient's case to be discussed with staff, Dr. Timoteo Bumpers, who will provide final recommendations going forward  - Wound care: Apply betadine paint and dsd to toe. Nursing orders in for dressing changes. - WB as tolerated in CAM boot with long sock or covering.   - Elevate b/l LE at or above heart level while resting  - Antibiotic coverage per primary   - Continue application of triamcinolone cream daily.   - Podiatry to follow peripherally while in house      No past medical history on file. No past surgical history on file. No current facility-administered medications on file prior to encounter. Current Outpatient Medications on File Prior to Encounter   Medication Sig Dispense Refill    levoFLOXacin (LEVAQUIN) 500 MG/100ML SOLN Infuse 500 mg intravenously every 24 hours      piperacillin-tazobactam (ZOSYN) infusion Infuse 3.375 g intravenously in the morning and 3.375 g at noon and 3.375 g in the evening. Compound per protocol. . (Patient not taking: Reported on 3/30/2023) 304 g 1       Allergies   Allergen ---Improving    Left hallux pre-ulcerative with surrounding maceration. No probing or undermining. No active drainage. No active signs of infection. LABS:   Lab Results   Component Value Date    WBC 9.2 03/30/2023    HGB 12.6 (L) 03/30/2023    HCT 37.0 (L) 03/30/2023    MCV 87.9 03/30/2023     03/30/2023     Lab Results   Component Value Date/Time     03/31/2023 06:00 AM    K 4.3 03/31/2023 06:00 AM    K 3.8 03/06/2023 06:03 AM     03/31/2023 06:00 AM    CO2 20 03/31/2023 06:00 AM    BUN 39 03/31/2023 06:00 AM    CREATININE 2.42 03/31/2023 06:00 AM    GLUCOSE 100 03/31/2023 06:00 AM    CALCIUM 7.8 03/31/2023 06:00 AM      Lab Results   Component Value Date    LABALBU 3.3 (L) 03/27/2023     Lab Results   Component Value Date    SEDRATE 93 (H) 03/31/2023     Lab Results   Component Value Date    CRP 9.1 (H) 03/05/2023     Lab Results   Component Value Date    LABA1C 11.8 (H) 03/05/2023       MICROBIOLOGY:   N/a    IMAGING:   Xr left toe 3/30/23:  Impression   Diffuse soft tissue swelling of the left 1st toe. Could consider repeat MRI   to further evaluate.            Vascular studies:  N/a    Marixa Weeks DPM, PGY-2  Podiatric Surgery Resident  Podiatry On Call Pager: [phone]  March 31, 2023  9:38 AM

## 2023-04-01 VITALS
HEIGHT: 70 IN | OXYGEN SATURATION: 99 % | RESPIRATION RATE: 16 BRPM | HEART RATE: 73 BPM | DIASTOLIC BLOOD PRESSURE: 88 MMHG | BODY MASS INDEX: 33.79 KG/M2 | WEIGHT: 236 LBS | TEMPERATURE: 98.2 F | SYSTOLIC BLOOD PRESSURE: 146 MMHG

## 2023-04-01 LAB
ANION GAP SERPL CALCULATED.3IONS-SCNC: 11 MEQ/L (ref 9–15)
BUN SERPL-MCNC: 36 MG/DL (ref 8–23)
CALCIUM SERPL-MCNC: 7.9 MG/DL (ref 8.5–9.9)
CHLORIDE SERPL-SCNC: 112 MEQ/L (ref 95–107)
CO2 SERPL-SCNC: 20 MEQ/L (ref 20–31)
CREAT SERPL-MCNC: 2.2 MG/DL (ref 0.7–1.2)
CREAT UR-MCNC: 61.3 MG/DL
GLUCOSE BLD-MCNC: 102 MG/DL (ref 70–99)
GLUCOSE BLD-MCNC: 120 MG/DL (ref 70–99)
GLUCOSE BLD-MCNC: 80 MG/DL (ref 70–99)
GLUCOSE BLD-MCNC: 92 MG/DL (ref 70–99)
GLUCOSE SERPL-MCNC: 96 MG/DL (ref 70–99)
MICROALBUMIN UR-MCNC: 3 MG/DL
MICROALBUMIN/CREAT UR-RTO: 48.9 MG/G (ref 0–30)
PERFORMED ON: ABNORMAL
PERFORMED ON: ABNORMAL
PERFORMED ON: NORMAL
PERFORMED ON: NORMAL
POTASSIUM SERPL-SCNC: 4.5 MEQ/L (ref 3.4–4.9)
SODIUM SERPL-SCNC: 143 MEQ/L (ref 135–144)

## 2023-04-01 PROCEDURE — 80048 BASIC METABOLIC PNL TOTAL CA: CPT

## 2023-04-01 PROCEDURE — 82043 UR ALBUMIN QUANTITATIVE: CPT

## 2023-04-01 PROCEDURE — 6370000000 HC RX 637 (ALT 250 FOR IP): Performed by: INTERNAL MEDICINE

## 2023-04-01 PROCEDURE — 2580000003 HC RX 258: Performed by: INTERNAL MEDICINE

## 2023-04-01 PROCEDURE — 82570 ASSAY OF URINE CREATININE: CPT

## 2023-04-01 RX ORDER — ASPIRIN 81 MG/1
81 TABLET, CHEWABLE ORAL DAILY
Qty: 30 TABLET | Refills: 3 | Status: SHIPPED | OUTPATIENT
Start: 2023-04-02 | End: 2023-04-07

## 2023-04-01 RX ORDER — GLIPIZIDE 5 MG/1
2.5 TABLET ORAL
Qty: 60 TABLET | Refills: 3 | Status: SHIPPED | OUTPATIENT
Start: 2023-04-01 | End: 2023-04-03 | Stop reason: SDUPTHER

## 2023-04-01 RX ADMIN — ASPIRIN 81 MG 81 MG: 81 TABLET ORAL at 09:17

## 2023-04-01 RX ADMIN — HYOSCYAMINE SULFATE: 16 SOLUTION at 09:22

## 2023-04-01 RX ADMIN — Medication: at 09:21

## 2023-04-01 RX ADMIN — GLIPIZIDE 2.5 MG: 5 TABLET ORAL at 09:17

## 2023-04-01 RX ADMIN — TRIAMCINOLONE ACETONIDE: 1 CREAM TOPICAL at 09:20

## 2023-04-01 RX ADMIN — LEVOFLOXACIN 250 MG: 250 TABLET, FILM COATED ORAL at 18:54

## 2023-04-01 RX ADMIN — LACTOBACILLUS TAB 1 TABLET: TAB at 09:17

## 2023-04-01 RX ADMIN — GLIPIZIDE 2.5 MG: 5 TABLET ORAL at 16:52

## 2023-04-01 RX ADMIN — Medication 10 ML: at 09:23

## 2023-04-01 NOTE — PLAN OF CARE
Problem: Risk for Elopement  Goal: Patient will not exit the unit/facility without proper excort  Outcome: Progressing     Problem: Pain  Goal: Verbalizes/displays adequate comfort level or baseline comfort level  Outcome: Progressing     Problem: Wound:  Goal: Will show signs of wound healing; wound closure and no evidence of infection  Description: Will show signs of wound healing; wound closure and no evidence of infection  Outcome: Progressing     Problem: Falls - Risk of:  Goal: Will remain free from falls  Description: Will remain free from falls  Outcome: Progressing     Problem: Blood Glucose:  Goal: Ability to maintain appropriate glucose levels will improve  Description: Ability to maintain appropriate glucose levels will improve  Outcome: Progressing     Problem: Nutrition Deficit:  Goal: Optimize nutritional status  Outcome: Progressing

## 2023-04-01 NOTE — PROGRESS NOTES
LABALBU, BILITOT in the last 72 hours. Invalid input(s): BILDIR  Iron:  No results for input(s): IRONS, FERRITIN in the last 72 hours. Invalid input(s): LABIRONS  Urinalysis: No results for input(s): UA in the last 72 hours.     Objective:  Vitals: BP (!) 141/83   Pulse 73   Temp 98.8 °F (37.1 °C) (Oral)   Resp 18   Ht 5' 10\" (1.778 m)   Wt 236 lb (107 kg)   SpO2 98%   BMI 33.86 kg/m²    Wt Readings from Last 3 Encounters:   03/30/23 236 lb (107 kg)   03/26/23 229 lb 8 oz (104.1 kg)   03/05/23 234 lb 3.2 oz (106.2 kg)      24HR INTAKE/OUTPUT:    Intake/Output Summary (Last 24 hours) at 4/1/2023 1136  Last data filed at 4/1/2023 9751  Gross per 24 hour   Intake 400 ml   Output --   Net 400 ml       General: alert, in no apparent distress  HEENT: normocephalic, atraumatic, anicteric  Neck: supple, no mass  Lungs: non-labored respirations, clear to auscultation bilaterally  Heart: regular rate and rhythm, no murmurs or rubs  Abdomen: soft, non-tender, non-distended  Ext: no cyanosis, no peripheral edema  Neuro: alert and oriented, no gross abnormalities  Psych: normal mood and affect  Skin: no rash      Electronically signed by Natalee Fraser DO, MD

## 2023-04-01 NOTE — PROGRESS NOTES
Shift assessment complete. VSS. A&Ox4. Pt calm and cooperating. Lung sound are clear. On room air. Denies having SOB, nausea, or pain. SR. Abdomen is soft and round. Bowel sounds are active. Tolerating PO diet. Meds given per MAR. Pt up independently in room. Dressing to left great toe changed and is CDI. BLE are carol, pale in color. LLE 3+ edema present. 2+ edema RLE. PICC line intact: dressing CDI. Pt ambulated in fermin this morning independently: Boot for left foot utilized. Call light in reach. No needs at this time. Care ongoing.      Electronically signed by María Mcpherson RN on 4/1/2023 at 1:50 PM

## 2023-04-01 NOTE — PROGRESS NOTES
Nephrology Progress Note    Assessment:      Plan:    Patient Active Problem List:     Cellulitis     Osteomyelitis of great toe of left foot (HCC)     Osteomyelitis (HCC)     DAVIAN (acute kidney injury) ([facility] [address]     Acute osteomyelitis of toe of left foot (HCC)     Drug-induced interstitial nephritis     Diabetic ulcer of toe of left foot associated with diabetes mellitus due to underlying condition, with fat layer exposed ([facility] [address]     Rash of foot     Elevated C-reactive protein (CRP)     Elevated C-reactive protein      Subjective:  Admit Date: 3/30/2023    Interval History: ***    Medications:  Scheduled Meds:   enoxaparin  30 mg SubCUTAneous BID    aspirin  81 mg Oral Daily    Sodium Hypochlorite   Irrigation Daily    sodium chloride flush  5-40 mL IntraVENous 2 times per day    insulin lispro  0-8 Units SubCUTAneous BID WC    glipiZIDE  2.5 mg Oral BID AC    ammonium lactate   Topical BID    levoFLOXacin  250 mg Oral Daily    pill splitter   Does not apply Once    lactobacillus acidophilus  1 tablet Oral Daily    triamcinolone   Topical BID     Continuous Infusions:   sodium chloride      dextrose      sodium chloride         CBC:   Recent Labs     03/30/23  0600   WBC 9.2   HGB 12.6*        CMP:    Recent Labs     03/30/23  0600 03/31/23  0600 04/01/23  0600    141 143   K 4.2 4.3 4.5    111* 112*   CO2 20 20 20   BUN 37* 39* 36*   CREATININE 2.34* 2.42* 2.20*   GLUCOSE 86 100* 96   CALCIUM 8.2* 7.8* 7.9*   LABGLOM 30.1* 29.0* 32.5*     Troponin: No results for input(s): TROPONINI in the last 72 hours. BNP: No results for input(s): BNP in the last 72 hours. INR: No results for input(s): INR in the last 72 hours. Lipids: No results for input(s): CHOL, LDLDIRECT, TRIG, HDL, AMYLASE, LIPASE in the last 72 hours. Liver: No results for input(s): AST, ALT, ALKPHOS, PROT, LABALBU, BILITOT in the last 72 hours.     Invalid input(s): BILDIR  Iron:  No results for input(s): IRONS, FERRITIN in the last 72 hours.    Invalid input(s): LABIRONS  Urinalysis: No results for input(s): UA in the last 72 hours.     Objective:  Vitals: BP (!) 141/83   Pulse 73   Temp 98.8 °F (37.1 °C) (Oral)   Resp 18   Ht 5' 10\" (1.778 m)   Wt 236 lb (107 kg)   SpO2 98%   BMI 33.86 kg/m²    Wt Readings from Last 3 Encounters:   03/30/23 236 lb (107 kg)   03/26/23 229 lb 8 oz (104.1 kg)   03/05/23 234 lb 3.2 oz (106.2 kg)      24HR INTAKE/OUTPUT:    Intake/Output Summary (Last 24 hours) at 4/1/2023 1044  Last data filed at 4/1/2023 0902  Gross per 24 hour   Intake 400 ml   Output --   Net 400 ml       General: alert, in no apparent distress  HEENT: normocephalic, atraumatic, anicteric  Neck: supple, no mass  Lungs: non-labored respirations, clear to auscultation bilaterally  Heart: regular rate and rhythm, no murmurs or rubs  Abdomen: soft, non-tender, non-distended  Ext: no cyanosis, no peripheral edema  Neuro: alert and oriented, no gross abnormalities  Psych: normal mood and affect  Skin: no rash      Electronically signed by Jacklyn Terrell DO, MD

## 2023-04-02 NOTE — FLOWSHEET NOTE
Pt now d/c off floor with belongings via transport with friend Michelle Sifuentes. PICC removed as ordered. This RN discuss discharge instruction with pt and copies was provided. Pt denies further questions or needs at the moment. No distress noted upon d/c.

## 2023-04-03 ENCOUNTER — TELEPHONE (OUTPATIENT)
Dept: INFECTIOUS DISEASES | Age: 63
End: 2023-04-03

## 2023-04-03 ENCOUNTER — TELEPHONE (OUTPATIENT)
Dept: FAMILY MEDICINE CLINIC | Age: 63
End: 2023-04-03

## 2023-04-03 LAB — BACTERIA BLD CULT ORG #2: NORMAL

## 2023-04-03 RX ORDER — GLIPIZIDE 5 MG/1
2.5 TABLET ORAL
Qty: 60 TABLET | Refills: 0 | Status: SHIPPED | OUTPATIENT
Start: 2023-04-03 | End: 2023-04-07 | Stop reason: SDUPTHER

## 2023-04-03 RX ORDER — LEVOFLOXACIN 250 MG/1
250 TABLET ORAL DAILY
Qty: 10 TABLET | Refills: 0 | Status: SHIPPED | OUTPATIENT
Start: 2023-04-03 | End: 2023-04-13

## 2023-04-03 NOTE — TELEPHONE ENCOUNTER
Care Transitions Initial Follow Up Call    Outreach made within 2 business days of discharge: Yes    Patient: Richelle Briggs Patient : 1960   MRN: [id number]  Reason for Admission: There are no discharge diagnoses documented for the most recent discharge.   Discharge Date: 23       Spoke with: Pt was called, left message to call back    Discharge department/facility: [facility]      Scheduled appointment with PCP within 7-14 days    Follow Up  Future Appointments   Date Time Provider Sage Eaton   2023  1:15 PM Jorge Harvey MD [address] [place] [address]

## 2023-04-03 NOTE — TELEPHONE ENCOUNTER
Patient's Niece called at about 3:55pm to request Medications to be called/ordered to a different pharmacy as patient has been d/c from [facility] as of Sat-4/1/23 and [facility] has not filled, nor has received the po orders. Patient came onto phone call via 3-way, he confirms Marc Heredia as his family and [id number] [address] to speak with her. After looking at progress notes and discharge orders, advised will assist in calling in medication to [facility]" at [phone]. Call placed and the pharmacist, Jane Barkley, stated the Meds were Just called in by Dr Arelis De Guzman. (Glipizide and Levaquin). Returned call to patient and advised. He will check with the pharmacy soon.

## 2023-04-06 NOTE — PROGRESS NOTES
Documentation Reviewer    PROVIDER RESPONSE TEXT:    I defer to infectious disease consultant regarding documentation of Left great   toe acute osteomyelitis.     Query created by: Sarah Barber on 4/5/2023 11:45 AM      Electronically signed by:  Esau Meza MD 4/6/2023 6:34 AM

## 2023-04-07 ENCOUNTER — OFFICE VISIT (OUTPATIENT)
Dept: FAMILY MEDICINE CLINIC | Age: 63
End: 2023-04-07

## 2023-04-07 VITALS
WEIGHT: 223 LBS | BODY MASS INDEX: 31.92 KG/M2 | SYSTOLIC BLOOD PRESSURE: 140 MMHG | HEART RATE: 83 BPM | OXYGEN SATURATION: 98 % | HEIGHT: 70 IN | TEMPERATURE: 98.1 F | DIASTOLIC BLOOD PRESSURE: 90 MMHG

## 2023-04-07 DIAGNOSIS — E11.51 TYPE 2 DIABETES MELLITUS WITH DIABETIC PERIPHERAL ANGIOPATHY WITHOUT GANGRENE, WITHOUT LONG-TERM CURRENT USE OF INSULIN (HCC): ICD-10-CM

## 2023-04-07 DIAGNOSIS — M86.9 OSTEOMYELITIS OF GREAT TOE OF LEFT FOOT (HCC): ICD-10-CM

## 2023-04-07 DIAGNOSIS — N17.9 AKI (ACUTE KIDNEY INJURY) (HCC): ICD-10-CM

## 2023-04-07 DIAGNOSIS — Z09 HOSPITAL DISCHARGE FOLLOW-UP: Primary | ICD-10-CM

## 2023-04-07 DIAGNOSIS — N14.2: ICD-10-CM

## 2023-04-07 PROBLEM — L97.522 DIABETIC ULCER OF TOE OF LEFT FOOT ASSOCIATED WITH DIABETES MELLITUS DUE TO UNDERLYING CONDITION, WITH FAT LAYER EXPOSED (HCC): Status: RESOLVED | Noted: 2023-03-30 | Resolved: 2023-04-07

## 2023-04-07 PROBLEM — R21 RASH OF FOOT: Status: RESOLVED | Noted: 2023-03-30 | Resolved: 2023-04-07

## 2023-04-07 PROBLEM — E08.621 DIABETIC ULCER OF TOE OF LEFT FOOT ASSOCIATED WITH DIABETES MELLITUS DUE TO UNDERLYING CONDITION, WITH FAT LAYER EXPOSED (HCC): Status: RESOLVED | Noted: 2023-03-30 | Resolved: 2023-04-07

## 2023-04-07 PROBLEM — M86.172 ACUTE OSTEOMYELITIS OF TOE OF LEFT FOOT (HCC): Status: RESOLVED | Noted: 2023-03-30 | Resolved: 2023-04-07

## 2023-04-07 PROBLEM — L03.90 CELLULITIS: Status: RESOLVED | Noted: 2023-03-05 | Resolved: 2023-04-07

## 2023-04-07 PROBLEM — R79.82 ELEVATED C-REACTIVE PROTEIN (CRP): Status: RESOLVED | Noted: 2023-03-30 | Resolved: 2023-04-07

## 2023-04-07 PROBLEM — R79.82 ELEVATED C-REACTIVE PROTEIN: Status: RESOLVED | Noted: 2023-03-30 | Resolved: 2023-04-07

## 2023-04-07 RX ORDER — GLIPIZIDE 5 MG/1
2.5 TABLET ORAL
Qty: 60 TABLET | Refills: 1 | Status: SHIPPED | OUTPATIENT
Start: 2023-04-07

## 2023-04-07 SDOH — ECONOMIC STABILITY: INCOME INSECURITY: HOW HARD IS IT FOR YOU TO PAY FOR THE VERY BASICS LIKE FOOD, HOUSING, MEDICAL CARE, AND HEATING?: NOT HARD AT ALL

## 2023-04-07 SDOH — ECONOMIC STABILITY: HOUSING INSECURITY
IN THE LAST 12 MONTHS, WAS THERE A TIME WHEN YOU DID NOT HAVE A STEADY PLACE TO SLEEP OR SLEPT IN A SHELTER (INCLUDING NOW)?: NO

## 2023-04-07 SDOH — ECONOMIC STABILITY: FOOD INSECURITY: WITHIN THE PAST 12 MONTHS, THE FOOD YOU BOUGHT JUST DIDN'T LAST AND YOU DIDN'T HAVE MONEY TO GET MORE.: NEVER TRUE

## 2023-04-07 SDOH — ECONOMIC STABILITY: FOOD INSECURITY: WITHIN THE PAST 12 MONTHS, YOU WORRIED THAT YOUR FOOD WOULD RUN OUT BEFORE YOU GOT MONEY TO BUY MORE.: NEVER TRUE

## 2023-04-07 ASSESSMENT — ENCOUNTER SYMPTOMS
ABDOMINAL PAIN: 0
SHORTNESS OF BREATH: 0
RHINORRHEA: 0
SORE THROAT: 0
DIARRHEA: 0
CONSTIPATION: 0
WHEEZING: 0
COUGH: 0

## 2023-04-07 ASSESSMENT — PATIENT HEALTH QUESTIONNAIRE - PHQ9: DEPRESSION UNABLE TO ASSESS: URGENT/EMERGENT SITUATION

## 2023-04-07 NOTE — PROGRESS NOTES
[address] [facility] [address] [facility] PRIMARY [facility]ARE  [id number]  Dept: [phone]  Dept Fax: : [phone]     Chief Complaint  Chief Complaint   Patient presents with    Follow-Up from Hospital     Cellulitis left great toe. [facility] 3/5-3/29/2023. States his organs were inflamed        HPI:  57 y.o.male who presents for the following:      Hosp f/u: admitted for L great toe osteomyelitis (DM2); stay complicated by DAVINA; eventually transferred to different hospital for further management; had debridement with podiatry; now finishing up course of levaquin and will f/u with ID; DM2 is a new diagnosis (11.8 A1c); started on glipizide 2.5 BID; set up with renal as OP; will f/u with podiatry next week    Admit Date: 3/9/2023  Discharge Date: 3/29/2023        Review of Systems   Constitutional:  Negative for chills and fever. HENT:  Negative for congestion, rhinorrhea and sore throat. Respiratory:  Negative for cough, shortness of breath and wheezing. Gastrointestinal:  Negative for abdominal pain, constipation and diarrhea. Endocrine: Negative for polydipsia and polyuria. Genitourinary:  Negative for dysuria, frequency and urgency. Skin:  Positive for wound. Neurological:  Negative for syncope, light-headedness, numbness and headaches. Psychiatric/Behavioral:  Negative for sleep disturbance. The patient is not nervous/anxious. History reviewed. No pertinent past medical history. History reviewed. No pertinent surgical history.   Social History     Socioeconomic History    Marital status: Single     Spouse name: Not on file    Number of children: Not on file    Years of education: Not on file    Highest education level: Not on file   Occupational History    Not on file   Tobacco Use    Smoking status: Never     Passive exposure: Never    Smokeless tobacco: Never   Vaping Use    Vaping Use: Never used

## 2023-04-17 ENCOUNTER — TELEPHONE (OUTPATIENT)
Dept: INFECTIOUS DISEASES | Age: 63
End: 2023-04-17

## 2023-05-08 DIAGNOSIS — E11.51 TYPE 2 DIABETES MELLITUS WITH DIABETIC PERIPHERAL ANGIOPATHY WITHOUT GANGRENE, WITHOUT LONG-TERM CURRENT USE OF INSULIN (HCC): Primary | ICD-10-CM

## 2023-05-19 RX ORDER — GLUCOSAMINE HCL/CHONDROITIN SU 500-400 MG
CAPSULE ORAL
Qty: 100 STRIP | Refills: 3 | Status: SHIPPED | OUTPATIENT
Start: 2023-05-19

## 2023-05-19 NOTE — TELEPHONE ENCOUNTER
----- Message from Prsicilla Peres sent at 5/19/2023 11:53 AM EDT -----  Subject: Refill Request    QUESTIONS  Name of Medication? Other - Contour Next test strips   Patient-reported dosage and instructions? Patient tests his blood sugar   once per day  How many days do you have left? 0  Preferred Pharmacy? [facility] phone number (if available)? [phone]  Additional Information for Provider? Patient has a Contour Next meter so   he just needs the test strips to use with his meter. Please text cell   phone @ [phone] if patient can't be reached. Thanks.  ---------------------------------------------------------------------------  --------------  Anushka BARDALES  What is the best way for the office to contact you? OK to leave message on   voicemail  Preferred Call Back Phone Number? [phone]  ---------------------------------------------------------------------------  --------------  SCRIPT ANSWERS  Relationship to Patient?  Self

## 2023-05-19 NOTE — TELEPHONE ENCOUNTER
Comments:     Last Office Visit (last PCP visit):   4/7/2023    Next Visit Date:  No future appointments. **If hasn't been seen in over a year OR hasn't followed up according to last diabetes/ADHD visit, make appointment for patient before sending refill to provider. Rx requested:  Requested Prescriptions     Pending Prescriptions Disp Refills    blood glucose monitor strips 100 strip 3     Sig: Test 1 time a day & as needed for symptoms of irregular blood glucose. Dispense sufficient amount for indicated testing frequency plus additional to accommodate PRN testing needs.

## 2023-05-26 DIAGNOSIS — E11.51 TYPE 2 DIABETES MELLITUS WITH DIABETIC PERIPHERAL ANGIOPATHY WITHOUT GANGRENE, WITHOUT LONG-TERM CURRENT USE OF INSULIN (HCC): Primary | ICD-10-CM

## 2023-05-26 RX ORDER — GLUCOSAMINE HCL/CHONDROITIN SU 500-400 MG
CAPSULE ORAL
Qty: 100 STRIP | Refills: 3 | Status: SHIPPED | OUTPATIENT
Start: 2023-05-26

## 2023-05-26 NOTE — TELEPHONE ENCOUNTER
Comments:     Last Office Visit (last PCP visit):   4/7/2023    Next Visit Date:  No future appointments. **If hasn't been seen in over a year OR hasn't followed up according to last diabetes/ADHD visit, make appointment for patient before sending refill to provider. Rx requested:  Requested Prescriptions     Pending Prescriptions Disp Refills    blood glucose monitor strips 100 strip 3     Sig: Contour Test Strips. Test 1 time a day .

## 2023-05-26 NOTE — TELEPHONE ENCOUNTER
Patient called in today stating he received Accu Chek test strips and they do not work. States his blood is super thick and needs the order to be changed to Contour Next test strips. Spoke with his insurance and they said he will just need a PA. Please call the patient once the prescription gets approved.

## 2024-04-18 ENCOUNTER — TELEPHONE (OUTPATIENT)
Dept: FAMILY MEDICINE CLINIC | Age: 64
End: 2024-04-18
